# Patient Record
Sex: MALE | Race: BLACK OR AFRICAN AMERICAN | Employment: UNEMPLOYED | URBAN - METROPOLITAN AREA
[De-identification: names, ages, dates, MRNs, and addresses within clinical notes are randomized per-mention and may not be internally consistent; named-entity substitution may affect disease eponyms.]

---

## 2017-07-20 ENCOUNTER — HOSPITAL ENCOUNTER (EMERGENCY)
Age: 62
Discharge: LEFT BEFORE DISCHARGE | End: 2017-07-20
Attending: EMERGENCY MEDICINE
Payer: MEDICARE

## 2017-07-20 VITALS
OXYGEN SATURATION: 100 % | RESPIRATION RATE: 18 BRPM | HEIGHT: 62 IN | WEIGHT: 98.11 LBS | DIASTOLIC BLOOD PRESSURE: 84 MMHG | HEART RATE: 85 BPM | SYSTOLIC BLOOD PRESSURE: 142 MMHG | BODY MASS INDEX: 18.05 KG/M2 | TEMPERATURE: 98.3 F

## 2017-07-20 PROCEDURE — 75810000275 HC EMERGENCY DEPT VISIT NO LEVEL OF CARE

## 2017-07-21 ENCOUNTER — HOSPITAL ENCOUNTER (EMERGENCY)
Age: 62
Discharge: HOME | End: 2017-07-21
Attending: EMERGENCY MEDICINE
Payer: MEDICARE

## 2017-07-21 VITALS
RESPIRATION RATE: 18 BRPM | DIASTOLIC BLOOD PRESSURE: 98 MMHG | OXYGEN SATURATION: 100 % | WEIGHT: 95.9 LBS | HEIGHT: 62 IN | BODY MASS INDEX: 17.65 KG/M2 | TEMPERATURE: 97.7 F | HEART RATE: 61 BPM | SYSTOLIC BLOOD PRESSURE: 119 MMHG

## 2017-07-21 DIAGNOSIS — E83.42 HYPOMAGNESEMIA: Primary | ICD-10-CM

## 2017-07-21 LAB
ALBUMIN SERPL BCP-MCNC: 3.7 G/DL (ref 3.5–5)
ALBUMIN/GLOB SERPL: 1 {RATIO} (ref 1.1–2.2)
ALP SERPL-CCNC: 93 U/L (ref 45–117)
ALT SERPL-CCNC: 25 U/L (ref 12–78)
ANION GAP BLD CALC-SCNC: 8 MMOL/L (ref 5–15)
APPEARANCE UR: CLEAR
AST SERPL W P-5'-P-CCNC: 24 U/L (ref 15–37)
ATRIAL RATE: 61 BPM
BACTERIA URNS QL MICRO: NEGATIVE /HPF
BILIRUB SERPL-MCNC: 0.5 MG/DL (ref 0.2–1)
BILIRUB UR QL: NEGATIVE
BUN SERPL-MCNC: 25 MG/DL (ref 6–20)
BUN/CREAT SERPL: 18 (ref 12–20)
CALCIUM SERPL-MCNC: 9 MG/DL (ref 8.5–10.1)
CALCULATED P AXIS, ECG09: 78 DEGREES
CALCULATED R AXIS, ECG10: 54 DEGREES
CALCULATED T AXIS, ECG11: 59 DEGREES
CHLORIDE SERPL-SCNC: 107 MMOL/L (ref 97–108)
CO2 SERPL-SCNC: 23 MMOL/L (ref 21–32)
COLOR UR: NORMAL
CREAT SERPL-MCNC: 1.37 MG/DL (ref 0.7–1.3)
DIAGNOSIS, 93000: NORMAL
EPITH CASTS URNS QL MICRO: NORMAL /LPF
ERYTHROCYTE [DISTWIDTH] IN BLOOD BY AUTOMATED COUNT: 14.3 % (ref 11.5–14.5)
GLOBULIN SER CALC-MCNC: 3.7 G/DL (ref 2–4)
GLUCOSE SERPL-MCNC: 91 MG/DL (ref 65–100)
GLUCOSE UR STRIP.AUTO-MCNC: NEGATIVE MG/DL
HCT VFR BLD AUTO: 32.2 % (ref 36.6–50.3)
HGB BLD-MCNC: 10.9 G/DL (ref 12.1–17)
HGB UR QL STRIP: NEGATIVE
HYALINE CASTS URNS QL MICRO: NORMAL /LPF (ref 0–5)
KETONES UR QL STRIP.AUTO: NEGATIVE MG/DL
LEUKOCYTE ESTERASE UR QL STRIP.AUTO: NEGATIVE
MAGNESIUM SERPL-MCNC: 1.3 MG/DL (ref 1.6–2.4)
MCH RBC QN AUTO: 30.4 PG (ref 26–34)
MCHC RBC AUTO-ENTMCNC: 33.9 G/DL (ref 30–36.5)
MCV RBC AUTO: 89.9 FL (ref 80–99)
NITRITE UR QL STRIP.AUTO: NEGATIVE
P-R INTERVAL, ECG05: 150 MS
PH UR STRIP: 5.5 [PH] (ref 5–8)
PHOSPHATE SERPL-MCNC: 2.7 MG/DL (ref 2.6–4.7)
PLATELET # BLD AUTO: 190 K/UL (ref 150–400)
POTASSIUM SERPL-SCNC: 4.5 MMOL/L (ref 3.5–5.1)
PROT SERPL-MCNC: 7.4 G/DL (ref 6.4–8.2)
PROT UR STRIP-MCNC: NEGATIVE MG/DL
Q-T INTERVAL, ECG07: 372 MS
QRS DURATION, ECG06: 66 MS
QTC CALCULATION (BEZET), ECG08: 374 MS
RBC # BLD AUTO: 3.58 M/UL (ref 4.1–5.7)
RBC #/AREA URNS HPF: NORMAL /HPF (ref 0–5)
SODIUM SERPL-SCNC: 138 MMOL/L (ref 136–145)
SP GR UR REFRACTOMETRY: 1.01 (ref 1–1.03)
UA: UC IF INDICATED,UAUC: NORMAL
UROBILINOGEN UR QL STRIP.AUTO: 0.2 EU/DL (ref 0.2–1)
VENTRICULAR RATE, ECG03: 61 BPM
WBC # BLD AUTO: 6.5 K/UL (ref 4.1–11.1)
WBC URNS QL MICRO: NORMAL /HPF (ref 0–4)

## 2017-07-21 PROCEDURE — 80053 COMPREHEN METABOLIC PANEL: CPT | Performed by: EMERGENCY MEDICINE

## 2017-07-21 PROCEDURE — 81001 URINALYSIS AUTO W/SCOPE: CPT | Performed by: EMERGENCY MEDICINE

## 2017-07-21 PROCEDURE — 85027 COMPLETE CBC AUTOMATED: CPT | Performed by: EMERGENCY MEDICINE

## 2017-07-21 PROCEDURE — 83735 ASSAY OF MAGNESIUM: CPT | Performed by: EMERGENCY MEDICINE

## 2017-07-21 PROCEDURE — 36415 COLL VENOUS BLD VENIPUNCTURE: CPT | Performed by: EMERGENCY MEDICINE

## 2017-07-21 PROCEDURE — 96365 THER/PROPH/DIAG IV INF INIT: CPT

## 2017-07-21 PROCEDURE — 93005 ELECTROCARDIOGRAM TRACING: CPT

## 2017-07-21 PROCEDURE — 74011250636 HC RX REV CODE- 250/636: Performed by: EMERGENCY MEDICINE

## 2017-07-21 PROCEDURE — 96361 HYDRATE IV INFUSION ADD-ON: CPT

## 2017-07-21 PROCEDURE — 99285 EMERGENCY DEPT VISIT HI MDM: CPT

## 2017-07-21 PROCEDURE — 84100 ASSAY OF PHOSPHORUS: CPT | Performed by: EMERGENCY MEDICINE

## 2017-07-21 RX ORDER — SODIUM CHLORIDE 0.9 % (FLUSH) 0.9 %
5-10 SYRINGE (ML) INJECTION EVERY 8 HOURS
Status: DISCONTINUED | OUTPATIENT
Start: 2017-07-21 | End: 2017-07-21 | Stop reason: HOSPADM

## 2017-07-21 RX ORDER — MAGNESIUM SULFATE HEPTAHYDRATE 40 MG/ML
2 INJECTION, SOLUTION INTRAVENOUS
Status: COMPLETED | OUTPATIENT
Start: 2017-07-21 | End: 2017-07-21

## 2017-07-21 RX ORDER — LISINOPRIL AND HYDROCHLOROTHIAZIDE 12.5; 2 MG/1; MG/1
TABLET ORAL DAILY
COMMUNITY
End: 2017-12-24

## 2017-07-21 RX ORDER — ALLOPURINOL 100 MG/1
100 TABLET ORAL DAILY
COMMUNITY

## 2017-07-21 RX ORDER — SODIUM CHLORIDE 0.9 % (FLUSH) 0.9 %
5-10 SYRINGE (ML) INJECTION AS NEEDED
Status: DISCONTINUED | OUTPATIENT
Start: 2017-07-21 | End: 2017-07-21 | Stop reason: HOSPADM

## 2017-07-21 RX ORDER — ACETAMINOPHEN 500 MG
500 TABLET ORAL
COMMUNITY

## 2017-07-21 RX ADMIN — SODIUM CHLORIDE 1000 ML: 900 INJECTION, SOLUTION INTRAVENOUS at 11:36

## 2017-07-21 RX ADMIN — MAGNESIUM SULFATE HEPTAHYDRATE 2 G: 40 INJECTION, SOLUTION INTRAVENOUS at 11:36

## 2017-07-21 NOTE — ED PROVIDER NOTES
HPI Comments: Mohinder Moscoso is a 65 y.o. M with PMHx significant for HTN who presents ambulatory to ED [facility] ED c/o abnormal lab results that shows decreased kidney function. Today, pt also endorses generalized weakness. Pt visited his PCP last week and had blood work that was abnormal. His PCP called him yesterday and recommended pt visit the ED. Patient was then seen in the ED yesterday night and discharged home. PCP called pt again this morning and said that he was in kidney failure and should visit ED again today because \"he could be admitted for dialysis\" due to potassium levels. Per pt's mother, pt had a syncopal episode \"a few weeks ago\" and after he woke up from episode he \"went back to sleep\". Recently, pt reports he has been outside and frequently sweating. Pt notes occasional etOH use, denies any tobacco or drug use. He denies any hx of kidney problems. Pt specifically denies any chest pain, SOB, nausea, vomiting, diarrhea or urinary sx. PCP: None    There are no other complaints, changes, or physical findings at this time. The history is provided by the patient. Past Medical History:   Diagnosis Date    Hypertension        History reviewed. No pertinent surgical history. History reviewed. No pertinent family history. Social History     Social History    Marital status: SINGLE     Spouse name: N/A    Number of children: N/A    Years of education: N/A     Occupational History    Not on file. Social History Main Topics    Smoking status: Never Smoker    Smokeless tobacco: Never Used    Alcohol use Yes      Comment: Everyday    Drug use: No    Sexual activity: Not on file     Other Topics Concern    Not on file     Social History Narrative    No narrative on file         ALLERGIES: Review of patient's allergies indicates no known allergies. Review of Systems   Constitutional: Negative for appetite change, chills, fatigue and fever. HENT: Negative.   Negative for congestion, rhinorrhea, sinus pressure and sore throat. Eyes: Negative. Respiratory: Negative. Negative for cough, choking, chest tightness, shortness of breath and wheezing. Cardiovascular: Negative. Negative for chest pain, palpitations and leg swelling. Gastrointestinal: Negative for abdominal pain, constipation, diarrhea, nausea and vomiting. Endocrine: Negative. Genitourinary: Negative. Negative for difficulty urinating, dysuria, flank pain, frequency, hematuria and urgency. Musculoskeletal: Negative. Skin: Negative. Neurological: Positive for weakness (generalized). Negative for dizziness, speech difficulty, light-headedness, numbness and headaches. Psychiatric/Behavioral: Negative. All other systems reviewed and are negative. Vitals:    07/21/17 1145 07/21/17 1215 07/21/17 1245 07/21/17 1315   BP: 127/81 124/77 118/78 126/82   Pulse: (!) 56 (!) 57 (!) 58 60   Resp: 17 16 14 19   Temp:       SpO2: 99% 99% 100% 100%   Weight:       Height:                Physical Exam   Constitutional: He is oriented to person, place, and time. He appears well-developed and well-nourished. No distress. HENT:   Head: Normocephalic and atraumatic. Mouth/Throat: Oropharynx is clear and moist. No oropharyngeal exudate. Eyes: Conjunctivae and EOM are normal. Pupils are equal, round, and reactive to light. Neck: Normal range of motion. Neck supple. No JVD present. No tracheal deviation present. Cardiovascular: Normal rate, regular rhythm, normal heart sounds and intact distal pulses. No murmur heard. Pulmonary/Chest: Effort normal and breath sounds normal. No stridor. No respiratory distress. He has no wheezes. He has no rales. He exhibits no tenderness. Abdominal: Soft. He exhibits no distension. There is no tenderness. There is no rebound and no guarding. Musculoskeletal: Normal range of motion. He exhibits no edema or tenderness.    Neurological: He is alert and oriented to person, place, and time. No cranial nerve deficit. No focal motor or sensory deficits    Skin: Skin is warm and dry. He is not diaphoretic. Psychiatric: He has a normal mood and affect. His behavior is normal.   Nursing note and vitals reviewed. MDM  Number of Diagnoses or Management Options  Diagnosis management comments: DDx: Acute renal failure, Electrolyte abnormality. Amount and/or Complexity of Data Reviewed  Clinical lab tests: ordered and reviewed  Tests in the medicine section of CPT®: ordered and reviewed  Review and summarize past medical records: yes  Independent visualization of images, tracings, or specimens: yes    Patient Progress  Patient progress: stable    ED Course       Procedures     EKG- NSR, rate 61, normal pr/qrs/axis, no acute St-T wave changes, Grace Kirk DO      PROGRESS NOTE:  12:56 PM  Attempted to contact [facility] hospitalist on three separate occasions but did not receive a call back. Written by Gordon Jara. Lashonda Orozco, ED Scribe, as dictated by Cami Denton DO. PROGRESS NOTE:  1:20 PM  Pt reevaluated. Pt is feeling better and ready for discharge. Written by Gordon Jara.  Norberto, ED Scribe, as dictated by Cami Denton DO    Patient Vitals for the past 12 hrs:   Temp Pulse Resp BP SpO2   07/21/17 1315 - 60 19 126/82 100 %   07/21/17 1245 - (!) 58 14 118/78 100 %   07/21/17 1215 - (!) 57 16 124/77 99 %   07/21/17 1145 - (!) 56 17 127/81 99 %   07/21/17 1115 - (!) 56 18 133/82 100 %   07/21/17 1100 - 65 18 125/87 99 %   07/21/17 1004 98 °F (36.7 °C) 63 16 115/69 100 %       LABORATORY TESTS:  Recent Results (from the past 12 hour(s))   EKG, 12 LEAD, INITIAL    Collection Time: 07/21/17 10:29 AM   Result Value Ref Range    Ventricular Rate 61 BPM    Atrial Rate 61 BPM    P-R Interval 150 ms    QRS Duration 66 ms    Q-T Interval 372 ms    QTC Calculation (Bezet) 374 ms    Calculated P Axis 78 degrees    Calculated R Axis 54 degrees    Calculated T Axis 59 degrees Diagnosis       ** Poor data quality, interpretation may be adversely affected  Normal sinus rhythm      Confirmed by Jason Allen MD, Casimiro Leyden ([id number]) on 7/21/2017 1:01:32 PM     METABOLIC PANEL, COMPREHENSIVE    Collection Time: 07/21/17 10:35 AM   Result Value Ref Range    Sodium 138 136 - 145 mmol/L    Potassium 4.5 3.5 - 5.1 mmol/L    Chloride 107 97 - 108 mmol/L    CO2 23 21 - 32 mmol/L    Anion gap 8 5 - 15 mmol/L    Glucose 91 65 - 100 mg/dL    BUN 25 (H) 6 - 20 MG/DL    Creatinine 1.37 (H) 0.70 - 1.30 MG/DL    BUN/Creatinine ratio 18 12 - 20      GFR est AA >60 >60 ml/min/1.73m2    GFR est non-AA 53 (L) >60 ml/min/1.73m2    Calcium 9.0 8.5 - 10.1 MG/DL    Bilirubin, total 0.5 0.2 - 1.0 MG/DL    ALT (SGPT) 25 12 - 78 U/L    AST (SGOT) 24 15 - 37 U/L    Alk.  phosphatase 93 45 - 117 U/L    Protein, total 7.4 6.4 - 8.2 g/dL    Albumin 3.7 3.5 - 5.0 g/dL    Globulin 3.7 2.0 - 4.0 g/dL    A-G Ratio 1.0 (L) 1.1 - 2.2     CBC W/O DIFF    Collection Time: 07/21/17 10:35 AM   Result Value Ref Range    WBC 6.5 4.1 - 11.1 K/uL    RBC 3.58 (L) 4.10 - 5.70 M/uL    HGB 10.9 (L) 12.1 - 17.0 g/dL    HCT 32.2 (L) 36.6 - 50.3 %    MCV 89.9 80.0 - 99.0 FL    MCH 30.4 26.0 - 34.0 PG    MCHC 33.9 30.0 - 36.5 g/dL    RDW 14.3 11.5 - 14.5 %    PLATELET 455 417 - 289 K/uL   MAGNESIUM    Collection Time: 07/21/17 10:35 AM   Result Value Ref Range    Magnesium 1.3 (L) 1.6 - 2.4 mg/dL   PHOSPHORUS    Collection Time: 07/21/17 10:35 AM   Result Value Ref Range    Phosphorus 2.7 2.6 - 4.7 MG/DL   URINALYSIS W/ REFLEX CULTURE    Collection Time: 07/21/17 11:15 AM   Result Value Ref Range    Color YELLOW/STRAW      Appearance CLEAR CLEAR      Specific gravity 1.009 1.003 - 1.030      pH (UA) 5.5 5.0 - 8.0      Protein NEGATIVE  NEG mg/dL    Glucose NEGATIVE  NEG mg/dL    Ketone NEGATIVE  NEG mg/dL    Bilirubin NEGATIVE  NEG      Blood NEGATIVE  NEG      Urobilinogen 0.2 0.2 - 1.0 EU/dL    Nitrites NEGATIVE  NEG      Leukocyte Esterase NEGATIVE NEG      WBC 0-4 0 - 4 /hpf    RBC 0-5 0 - 5 /hpf    Epithelial cells FEW FEW /lpf    Bacteria NEGATIVE  NEG /hpf    UA:UC IF INDICATED CULTURE NOT INDICATED BY UA RESULT CNI      Hyaline cast 0-2 0 - 5 /lpf     MEDICATIONS GIVEN:  Medications   sodium chloride (NS) flush 5-10 mL (not administered)   sodium chloride (NS) flush 5-10 mL (not administered)   magnesium sulfate 2 g/50 ml IVPB (premix or compounded) (0 g IntraVENous IV Completed 7/21/17 1236)   sodium chloride 0.9 % bolus infusion 1,000 mL (1,000 mL IntraVENous New Bag 7/21/17 1136)       IMPRESSION:  1. Hypomagnesemia        PLAN:  1. Current Discharge Medication List        2. Follow-up Information     Follow up With Details Comments Contact Info    None  As needed None ([phone]) Patient stated that they have no PCP        Follow up with your [facility] provider         Return to ED if worse     DISCHARGE NOTE:  1:24 PM  The patient's results have been reviewed with family and/or caregiver. They verbally convey their understanding and agreement of the patient's signs, symptoms, diagnosis, treatment, and prognosis. They additionally agree to follow up as recommended in the discharge instructions or to return to the Emergency Room should the patient's condition change prior to their follow-up appointment. The family and/or caregiver verbally agrees with the care-plan and all of their questions have been answered. The discharge instructions have also been provided to the them along with educational information regarding the patient's diagnosis and a list of reasons why the patient would want to return to the ER prior to their follow-up appointment should their condition change. Written by Ethan Padilla, ED Scribe, as dictated by Marco Barrett DO. Attestations: This note is prepared by Ethan Drake.  Norberto, acting as Scribe for Marco Barrett, [facility], : The scribe's documentation has been prepared under my direction and personally reviewed by me in its entirety. I confirm that the note above accurately reflects all work, treatment, procedures, and medical decision making performed by me.

## 2017-07-21 NOTE — ED PROVIDER NOTES
HPI     No past medical history on file. No past surgical history on file. No family history on file. Social History     Social History    Marital status: SINGLE     Spouse name: N/A    Number of children: N/A    Years of education: N/A     Occupational History    Not on file. Social History Main Topics    Smoking status: Not on file    Smokeless tobacco: Not on file    Alcohol use Not on file    Drug use: Not on file    Sexual activity: Not on file     Other Topics Concern    Not on file     Social History Narrative         ALLERGIES: Review of patient's allergies indicates no known allergies.     Review of Systems    Vitals:    07/20/17 2030   BP: 142/84   Pulse: 85   Resp: 18   Temp: 98.3 °F (36.8 °C)   SpO2: 100%   Weight: 44.5 kg (98 lb 1.7 oz)   Height: 5' 2\" (1.575 m)            Physical Exam     [facility]  ED Course       Procedures

## 2017-07-21 NOTE — ED NOTES
Patient's mother reports patient had a syncopal episode \"a few weeks ago\". Patient stated that after he woke up he \"went back to sleep\".

## 2017-07-21 NOTE — ED NOTES
Assumed care of the patient. Patient mother at the bedside. Patient visited PCP last week and blood work was abnormal. PCP called yesterday and told patient to visit the ED. Patient visited ED last night, PCP called this morning and told mother that the patient needed to visit ED again today because \"he could be admitted for dialysis\" due to potassium levels.

## 2017-12-18 ENCOUNTER — ANESTHESIA EVENT (OUTPATIENT)
Dept: SURGERY | Age: 62
DRG: 356 | End: 2017-12-18
Payer: MEDICARE

## 2017-12-18 ENCOUNTER — HOSPITAL ENCOUNTER (INPATIENT)
Age: 62
LOS: 6 days | Discharge: HOME | DRG: 356 | End: 2017-12-24
Attending: EMERGENCY MEDICINE | Admitting: FAMILY MEDICINE
Payer: MEDICARE

## 2017-12-18 ENCOUNTER — APPOINTMENT (OUTPATIENT)
Dept: GENERAL RADIOLOGY | Age: 62
DRG: 356 | End: 2017-12-18
Attending: EMERGENCY MEDICINE
Payer: MEDICARE

## 2017-12-18 ENCOUNTER — ANESTHESIA (OUTPATIENT)
Dept: SURGERY | Age: 62
DRG: 356 | End: 2017-12-18
Payer: MEDICARE

## 2017-12-18 ENCOUNTER — APPOINTMENT (OUTPATIENT)
Dept: CT IMAGING | Age: 62
DRG: 356 | End: 2017-12-18
Attending: EMERGENCY MEDICINE
Payer: MEDICARE

## 2017-12-18 ENCOUNTER — APPOINTMENT (OUTPATIENT)
Dept: ULTRASOUND IMAGING | Age: 62
DRG: 356 | End: 2017-12-18
Attending: FAMILY MEDICINE
Payer: MEDICARE

## 2017-12-18 DIAGNOSIS — K55.9 ISCHEMIC BOWEL DISEASE (HCC): Primary | ICD-10-CM

## 2017-12-18 DIAGNOSIS — R00.1 BRADYCARDIA: ICD-10-CM

## 2017-12-18 DIAGNOSIS — N17.9 ACUTE RENAL FAILURE, UNSPECIFIED ACUTE RENAL FAILURE TYPE (HCC): ICD-10-CM

## 2017-12-18 DIAGNOSIS — E86.1 HYPOVOLEMIA: ICD-10-CM

## 2017-12-18 DIAGNOSIS — R19.7 DIARRHEA, UNSPECIFIED TYPE: ICD-10-CM

## 2017-12-18 PROBLEM — R55 SYNCOPE: Status: ACTIVE | Noted: 2017-12-18

## 2017-12-18 PROBLEM — K63.89 PNEUMATOSIS INTESTINALIS: Status: ACTIVE | Noted: 2017-12-18

## 2017-12-18 PROBLEM — E87.20 LACTIC ACIDOSIS: Status: ACTIVE | Noted: 2017-12-18

## 2017-12-18 PROBLEM — R57.9 SHOCK (HCC): Status: ACTIVE | Noted: 2017-12-18

## 2017-12-18 LAB
ALBUMIN SERPL-MCNC: 4.1 G/DL (ref 3.5–5)
ALBUMIN/GLOB SERPL: 1 {RATIO} (ref 1.1–2.2)
ALP SERPL-CCNC: 87 U/L (ref 45–117)
ALT SERPL-CCNC: 24 U/L (ref 12–78)
ANION GAP SERPL CALC-SCNC: 14 MMOL/L (ref 5–15)
APPEARANCE UR: ABNORMAL
ARTERIAL PATENCY WRIST A: YES
AST SERPL-CCNC: 32 U/L (ref 15–37)
BACTERIA URNS QL MICRO: ABNORMAL /HPF
BASE DEFICIT BLD-SCNC: 11 MMOL/L
BDY SITE: ABNORMAL
BILIRUB SERPL-MCNC: 0.3 MG/DL (ref 0.2–1)
BILIRUB UR QL: NEGATIVE
BUN SERPL-MCNC: 45 MG/DL (ref 6–20)
BUN/CREAT SERPL: 9 (ref 12–20)
CALCIUM SERPL-MCNC: 9.1 MG/DL (ref 8.5–10.1)
CHLORIDE SERPL-SCNC: 97 MMOL/L (ref 97–108)
CK MB CFR SERPL CALC: ABNORMAL % (ref 0–2.5)
CK MB SERPL-MCNC: <1 NG/ML (ref 5–25)
CK SERPL-CCNC: 370 U/L (ref 39–308)
CO2 SERPL-SCNC: 19 MMOL/L (ref 21–32)
COLOR UR: ABNORMAL
CREAT SERPL-MCNC: 5.01 MG/DL (ref 0.7–1.3)
EPITH CASTS URNS QL MICRO: ABNORMAL /LPF
ERYTHROCYTE [DISTWIDTH] IN BLOOD BY AUTOMATED COUNT: 14.1 % (ref 11.5–14.5)
GAS FLOW.O2 O2 DELIVERY SYS: ABNORMAL L/MIN
GLOBULIN SER CALC-MCNC: 4.1 G/DL (ref 2–4)
GLUCOSE SERPL-MCNC: 219 MG/DL (ref 65–100)
GLUCOSE UR STRIP.AUTO-MCNC: 100 MG/DL
HCO3 BLD-SCNC: 13.4 MMOL/L (ref 22–26)
HCT VFR BLD AUTO: 38.5 % (ref 36.6–50.3)
HGB BLD-MCNC: 13.1 G/DL (ref 12.1–17)
HGB UR QL STRIP: ABNORMAL
KETONES UR QL STRIP.AUTO: NEGATIVE MG/DL
LACTATE SERPL-SCNC: 1.5 MMOL/L (ref 0.4–2)
LACTATE SERPL-SCNC: 3.5 MMOL/L (ref 0.4–2)
LEUKOCYTE ESTERASE UR QL STRIP.AUTO: NEGATIVE
MAGNESIUM SERPL-MCNC: 1.8 MG/DL (ref 1.6–2.4)
MCH RBC QN AUTO: 31.1 PG (ref 26–34)
MCHC RBC AUTO-ENTMCNC: 34 G/DL (ref 30–36.5)
MCV RBC AUTO: 91.4 FL (ref 80–99)
MUCOUS THREADS URNS QL MICRO: ABNORMAL /LPF
NITRITE UR QL STRIP.AUTO: NEGATIVE
PCO2 BLD: 20.8 MMHG (ref 35–45)
PH BLD: 7.42 [PH] (ref 7.35–7.45)
PH UR STRIP: 5.5 [PH] (ref 5–8)
PHOSPHATE SERPL-MCNC: 7.3 MG/DL (ref 2.6–4.7)
PLATELET # BLD AUTO: 181 K/UL (ref 150–400)
PO2 BLD: 102 MMHG (ref 80–100)
POTASSIUM SERPL-SCNC: 4.3 MMOL/L (ref 3.5–5.1)
PROT SERPL-MCNC: 8.2 G/DL (ref 6.4–8.2)
PROT UR STRIP-MCNC: 100 MG/DL
RBC # BLD AUTO: 4.21 M/UL (ref 4.1–5.7)
RBC #/AREA URNS HPF: ABNORMAL /HPF (ref 0–5)
SAO2 % BLD: 98 % (ref 92–97)
SODIUM SERPL-SCNC: 130 MMOL/L (ref 136–145)
SP GR UR REFRACTOMETRY: 1.01 (ref 1–1.03)
SPECIMEN TYPE: ABNORMAL
TROPONIN I SERPL-MCNC: <0.04 NG/ML
UR CULT HOLD, URHOLD: NORMAL
UROBILINOGEN UR QL STRIP.AUTO: 0.2 EU/DL (ref 0.2–1)
WBC # BLD AUTO: 9.8 K/UL (ref 4.1–11.1)
WBC URNS QL MICRO: ABNORMAL /HPF (ref 0–4)

## 2017-12-18 PROCEDURE — 80053 COMPREHEN METABOLIC PANEL: CPT | Performed by: EMERGENCY MEDICINE

## 2017-12-18 PROCEDURE — 76060000033 HC ANESTHESIA 1 TO 1.5 HR: Performed by: SURGERY

## 2017-12-18 PROCEDURE — P9045 ALBUMIN (HUMAN), 5%, 250 ML: HCPCS

## 2017-12-18 PROCEDURE — 71010 XR CHEST PORT: CPT

## 2017-12-18 PROCEDURE — 77030026438 HC STYL ET INTUB CARD -A: Performed by: ANESTHESIOLOGY

## 2017-12-18 PROCEDURE — 86706 HEP B SURFACE ANTIBODY: CPT | Performed by: FAMILY MEDICINE

## 2017-12-18 PROCEDURE — 65660000000 HC RM CCU STEPDOWN

## 2017-12-18 PROCEDURE — 82803 BLOOD GASES ANY COMBINATION: CPT

## 2017-12-18 PROCEDURE — 74011250636 HC RX REV CODE- 250/636

## 2017-12-18 PROCEDURE — 77030019702 HC WRP THER MENM -C: Performed by: SURGERY

## 2017-12-18 PROCEDURE — 80053 COMPREHEN METABOLIC PANEL: CPT | Performed by: FAMILY MEDICINE

## 2017-12-18 PROCEDURE — 93005 ELECTROCARDIOGRAM TRACING: CPT

## 2017-12-18 PROCEDURE — 74011250636 HC RX REV CODE- 250/636: Performed by: FAMILY MEDICINE

## 2017-12-18 PROCEDURE — 77030018836 HC SOL IRR NACL ICUM -A: Performed by: SURGERY

## 2017-12-18 PROCEDURE — 76770 US EXAM ABDO BACK WALL COMP: CPT

## 2017-12-18 PROCEDURE — 51702 INSERT TEMP BLADDER CATH: CPT

## 2017-12-18 PROCEDURE — 82550 ASSAY OF CK (CPK): CPT | Performed by: EMERGENCY MEDICINE

## 2017-12-18 PROCEDURE — 77030010507 HC ADH SKN DERMBND J&J -B: Performed by: SURGERY

## 2017-12-18 PROCEDURE — 36600 WITHDRAWAL OF ARTERIAL BLOOD: CPT

## 2017-12-18 PROCEDURE — 82595 ASSAY OF CRYOGLOBULIN: CPT | Performed by: FAMILY MEDICINE

## 2017-12-18 PROCEDURE — 74176 CT ABD & PELVIS W/O CONTRAST: CPT

## 2017-12-18 PROCEDURE — 83735 ASSAY OF MAGNESIUM: CPT | Performed by: EMERGENCY MEDICINE

## 2017-12-18 PROCEDURE — 0WJG0ZZ INSPECTION OF PERITONEAL CAVITY, OPEN APPROACH: ICD-10-PCS | Performed by: SURGERY

## 2017-12-18 PROCEDURE — 77030032490 HC SLV COMPR SCD KNE COVD -B: Performed by: SURGERY

## 2017-12-18 PROCEDURE — 74011250636 HC RX REV CODE- 250/636: Performed by: EMERGENCY MEDICINE

## 2017-12-18 PROCEDURE — 84100 ASSAY OF PHOSPHORUS: CPT | Performed by: EMERGENCY MEDICINE

## 2017-12-18 PROCEDURE — 83605 ASSAY OF LACTIC ACID: CPT | Performed by: EMERGENCY MEDICINE

## 2017-12-18 PROCEDURE — 87040 BLOOD CULTURE FOR BACTERIA: CPT | Performed by: EMERGENCY MEDICINE

## 2017-12-18 PROCEDURE — 74011000250 HC RX REV CODE- 250: Performed by: FAMILY MEDICINE

## 2017-12-18 PROCEDURE — 74011000250 HC RX REV CODE- 250

## 2017-12-18 PROCEDURE — 77030018846 HC SOL IRR STRL H20 ICUM -A: Performed by: SURGERY

## 2017-12-18 PROCEDURE — 36415 COLL VENOUS BLD VENIPUNCTURE: CPT | Performed by: EMERGENCY MEDICINE

## 2017-12-18 PROCEDURE — 96360 HYDRATION IV INFUSION INIT: CPT

## 2017-12-18 PROCEDURE — 87086 URINE CULTURE/COLONY COUNT: CPT | Performed by: EMERGENCY MEDICINE

## 2017-12-18 PROCEDURE — 86803 HEPATITIS C AB TEST: CPT | Performed by: FAMILY MEDICINE

## 2017-12-18 PROCEDURE — 84484 ASSAY OF TROPONIN QUANT: CPT | Performed by: EMERGENCY MEDICINE

## 2017-12-18 PROCEDURE — 85027 COMPLETE CBC AUTOMATED: CPT | Performed by: EMERGENCY MEDICINE

## 2017-12-18 PROCEDURE — 77030011640 HC PAD GRND REM COVD -A: Performed by: SURGERY

## 2017-12-18 PROCEDURE — 77030002996 HC SUT SLK J&J -A: Performed by: SURGERY

## 2017-12-18 PROCEDURE — 77030002966 HC SUT PDS J&J -A: Performed by: SURGERY

## 2017-12-18 PROCEDURE — 76210000006 HC OR PH I REC 0.5 TO 1 HR: Performed by: SURGERY

## 2017-12-18 PROCEDURE — 77030008684 HC TU ET CUF COVD -B: Performed by: ANESTHESIOLOGY

## 2017-12-18 PROCEDURE — 77030008467 HC STPLR SKN COVD -B: Performed by: SURGERY

## 2017-12-18 PROCEDURE — 99285 EMERGENCY DEPT VISIT HI MDM: CPT

## 2017-12-18 PROCEDURE — 77030031753 HC SHR ENDO COAG HARM J&J -E: Performed by: SURGERY

## 2017-12-18 PROCEDURE — 81001 URINALYSIS AUTO W/SCOPE: CPT | Performed by: EMERGENCY MEDICINE

## 2017-12-18 PROCEDURE — 77030013079 HC BLNKT BAIR HGGR 3M -A: Performed by: ANESTHESIOLOGY

## 2017-12-18 PROCEDURE — 84165 PROTEIN E-PHORESIS SERUM: CPT | Performed by: FAMILY MEDICINE

## 2017-12-18 PROCEDURE — 77030008771 HC TU NG SALEM SUMP -A: Performed by: ANESTHESIOLOGY

## 2017-12-18 PROCEDURE — 76010000149 HC OR TIME 1 TO 1.5 HR: Performed by: SURGERY

## 2017-12-18 RX ORDER — SODIUM CHLORIDE 9 MG/ML
125 INJECTION, SOLUTION INTRAVENOUS CONTINUOUS
Status: DISCONTINUED | OUTPATIENT
Start: 2017-12-18 | End: 2017-12-19

## 2017-12-18 RX ORDER — SODIUM CHLORIDE 0.9 % (FLUSH) 0.9 %
5-10 SYRINGE (ML) INJECTION AS NEEDED
Status: DISCONTINUED | OUTPATIENT
Start: 2017-12-18 | End: 2017-12-19 | Stop reason: HOSPADM

## 2017-12-18 RX ORDER — MIDAZOLAM HYDROCHLORIDE 1 MG/ML
INJECTION, SOLUTION INTRAMUSCULAR; INTRAVENOUS AS NEEDED
Status: DISCONTINUED | OUTPATIENT
Start: 2017-12-18 | End: 2017-12-19 | Stop reason: HOSPADM

## 2017-12-18 RX ORDER — SODIUM CHLORIDE 0.9 % (FLUSH) 0.9 %
5-10 SYRINGE (ML) INJECTION EVERY 8 HOURS
Status: DISCONTINUED | OUTPATIENT
Start: 2017-12-18 | End: 2017-12-19 | Stop reason: HOSPADM

## 2017-12-18 RX ORDER — MIDAZOLAM HYDROCHLORIDE 1 MG/ML
1 INJECTION, SOLUTION INTRAMUSCULAR; INTRAVENOUS AS NEEDED
Status: DISCONTINUED | OUTPATIENT
Start: 2017-12-18 | End: 2017-12-19 | Stop reason: HOSPADM

## 2017-12-18 RX ORDER — PHENYLEPHRINE HCL IN 0.9% NACL 0.4MG/10ML
SYRINGE (ML) INTRAVENOUS AS NEEDED
Status: DISCONTINUED | OUTPATIENT
Start: 2017-12-18 | End: 2017-12-19 | Stop reason: HOSPADM

## 2017-12-18 RX ORDER — FENTANYL CITRATE 50 UG/ML
INJECTION, SOLUTION INTRAMUSCULAR; INTRAVENOUS AS NEEDED
Status: DISCONTINUED | OUTPATIENT
Start: 2017-12-18 | End: 2017-12-19 | Stop reason: HOSPADM

## 2017-12-18 RX ORDER — SODIUM CHLORIDE 0.9 % (FLUSH) 0.9 %
5-10 SYRINGE (ML) INJECTION AS NEEDED
Status: DISCONTINUED | OUTPATIENT
Start: 2017-12-18 | End: 2017-12-20 | Stop reason: SDUPTHER

## 2017-12-18 RX ORDER — LIDOCAINE HYDROCHLORIDE 10 MG/ML
0.1 INJECTION, SOLUTION EPIDURAL; INFILTRATION; INTRACAUDAL; PERINEURAL AS NEEDED
Status: DISCONTINUED | OUTPATIENT
Start: 2017-12-18 | End: 2017-12-19 | Stop reason: HOSPADM

## 2017-12-18 RX ORDER — ALBUMIN HUMAN 50 G/1000ML
SOLUTION INTRAVENOUS AS NEEDED
Status: DISCONTINUED | OUTPATIENT
Start: 2017-12-18 | End: 2017-12-19 | Stop reason: HOSPADM

## 2017-12-18 RX ORDER — PROPOFOL 10 MG/ML
INJECTION, EMULSION INTRAVENOUS AS NEEDED
Status: DISCONTINUED | OUTPATIENT
Start: 2017-12-18 | End: 2017-12-19 | Stop reason: HOSPADM

## 2017-12-18 RX ORDER — DEXAMETHASONE SODIUM PHOSPHATE 4 MG/ML
INJECTION, SOLUTION INTRA-ARTICULAR; INTRALESIONAL; INTRAMUSCULAR; INTRAVENOUS; SOFT TISSUE AS NEEDED
Status: DISCONTINUED | OUTPATIENT
Start: 2017-12-18 | End: 2017-12-19 | Stop reason: HOSPADM

## 2017-12-18 RX ORDER — ONDANSETRON 2 MG/ML
INJECTION INTRAMUSCULAR; INTRAVENOUS AS NEEDED
Status: DISCONTINUED | OUTPATIENT
Start: 2017-12-18 | End: 2017-12-19 | Stop reason: HOSPADM

## 2017-12-18 RX ORDER — ACETAMINOPHEN 10 MG/ML
INJECTION, SOLUTION INTRAVENOUS AS NEEDED
Status: DISCONTINUED | OUTPATIENT
Start: 2017-12-18 | End: 2017-12-19 | Stop reason: HOSPADM

## 2017-12-18 RX ORDER — LORAZEPAM 2 MG/ML
2 INJECTION INTRAMUSCULAR
Status: DISCONTINUED | OUTPATIENT
Start: 2017-12-18 | End: 2017-12-24 | Stop reason: HOSPADM

## 2017-12-18 RX ORDER — LIDOCAINE HYDROCHLORIDE 20 MG/ML
INJECTION, SOLUTION EPIDURAL; INFILTRATION; INTRACAUDAL; PERINEURAL AS NEEDED
Status: DISCONTINUED | OUTPATIENT
Start: 2017-12-18 | End: 2017-12-19 | Stop reason: HOSPADM

## 2017-12-18 RX ORDER — SUCCINYLCHOLINE CHLORIDE 20 MG/ML
INJECTION INTRAMUSCULAR; INTRAVENOUS AS NEEDED
Status: DISCONTINUED | OUTPATIENT
Start: 2017-12-18 | End: 2017-12-19 | Stop reason: HOSPADM

## 2017-12-18 RX ORDER — LORAZEPAM 2 MG/ML
1 INJECTION INTRAMUSCULAR
Status: DISCONTINUED | OUTPATIENT
Start: 2017-12-18 | End: 2017-12-24 | Stop reason: HOSPADM

## 2017-12-18 RX ORDER — FENTANYL CITRATE 50 UG/ML
50 INJECTION, SOLUTION INTRAMUSCULAR; INTRAVENOUS AS NEEDED
Status: DISCONTINUED | OUTPATIENT
Start: 2017-12-18 | End: 2017-12-19 | Stop reason: HOSPADM

## 2017-12-18 RX ORDER — EPHEDRINE SULFATE 50 MG/ML
INJECTION, SOLUTION INTRAVENOUS AS NEEDED
Status: DISCONTINUED | OUTPATIENT
Start: 2017-12-18 | End: 2017-12-19 | Stop reason: HOSPADM

## 2017-12-18 RX ORDER — PIPERACILLIN SODIUM, TAZOBACTAM SODIUM 3; .375 G/15ML; G/15ML
INJECTION, POWDER, LYOPHILIZED, FOR SOLUTION INTRAVENOUS
Status: DISPENSED
Start: 2017-12-18 | End: 2017-12-19

## 2017-12-18 RX ORDER — SODIUM CHLORIDE, SODIUM LACTATE, POTASSIUM CHLORIDE, CALCIUM CHLORIDE 600; 310; 30; 20 MG/100ML; MG/100ML; MG/100ML; MG/100ML
125 INJECTION, SOLUTION INTRAVENOUS CONTINUOUS
Status: DISCONTINUED | OUTPATIENT
Start: 2017-12-18 | End: 2017-12-19 | Stop reason: HOSPADM

## 2017-12-18 RX ORDER — SODIUM CHLORIDE 9 MG/ML
50 INJECTION, SOLUTION INTRAVENOUS CONTINUOUS
Status: DISCONTINUED | OUTPATIENT
Start: 2017-12-18 | End: 2017-12-19

## 2017-12-18 RX ORDER — ROCURONIUM BROMIDE 10 MG/ML
INJECTION, SOLUTION INTRAVENOUS AS NEEDED
Status: DISCONTINUED | OUTPATIENT
Start: 2017-12-18 | End: 2017-12-19 | Stop reason: HOSPADM

## 2017-12-18 RX ORDER — ATROPINE SULFATE 1 MG/ML
INJECTION, SOLUTION INTRAVENOUS
Status: COMPLETED
Start: 2017-12-18 | End: 2017-12-18

## 2017-12-18 RX ORDER — SODIUM CHLORIDE 0.9 % (FLUSH) 0.9 %
5-10 SYRINGE (ML) INJECTION EVERY 8 HOURS
Status: DISCONTINUED | OUTPATIENT
Start: 2017-12-18 | End: 2017-12-20 | Stop reason: SDUPTHER

## 2017-12-18 RX ADMIN — SODIUM CHLORIDE 1000 ML: 900 INJECTION, SOLUTION INTRAVENOUS at 19:12

## 2017-12-18 RX ADMIN — SODIUM CHLORIDE 1000 ML: 900 INJECTION, SOLUTION INTRAVENOUS at 19:19

## 2017-12-18 RX ADMIN — PIPERACILLIN SODIUM AND TAZOBACTAM SODIUM 6.75 G: 3; .375 INJECTION, POWDER, LYOPHILIZED, FOR SOLUTION INTRAVENOUS at 23:53

## 2017-12-18 RX ADMIN — ALBUMIN HUMAN 250 ML: 50 SOLUTION INTRAVENOUS at 23:47

## 2017-12-18 RX ADMIN — ACETAMINOPHEN 500 MG: 10 INJECTION, SOLUTION INTRAVENOUS at 23:55

## 2017-12-18 RX ADMIN — PROPOFOL 120 MG: 10 INJECTION, EMULSION INTRAVENOUS at 23:22

## 2017-12-18 RX ADMIN — Medication 80 MCG: at 23:33

## 2017-12-18 RX ADMIN — ROCURONIUM BROMIDE 5 MG: 10 INJECTION, SOLUTION INTRAVENOUS at 23:22

## 2017-12-18 RX ADMIN — SODIUM CHLORIDE 500 ML: 900 INJECTION, SOLUTION INTRAVENOUS at 20:19

## 2017-12-18 RX ADMIN — ONDANSETRON 4 MG: 2 INJECTION INTRAMUSCULAR; INTRAVENOUS at 23:58

## 2017-12-18 RX ADMIN — FOLIC ACID: 5 INJECTION, SOLUTION INTRAMUSCULAR; INTRAVENOUS; SUBCUTANEOUS at 23:46

## 2017-12-18 RX ADMIN — Medication 80 MCG: at 23:26

## 2017-12-18 RX ADMIN — SODIUM CHLORIDE 1000 ML: 900 INJECTION, SOLUTION INTRAVENOUS at 21:23

## 2017-12-18 RX ADMIN — ATROPINE SULFATE 1 MG: 1 INJECTION, SOLUTION INTRAMUSCULAR; INTRAVENOUS; SUBCUTANEOUS at 19:12

## 2017-12-18 RX ADMIN — DEXAMETHASONE SODIUM PHOSPHATE 4 MG: 4 INJECTION, SOLUTION INTRA-ARTICULAR; INTRALESIONAL; INTRAMUSCULAR; INTRAVENOUS; SOFT TISSUE at 23:58

## 2017-12-18 RX ADMIN — LIDOCAINE HYDROCHLORIDE 40 MG: 20 INJECTION, SOLUTION EPIDURAL; INFILTRATION; INTRACAUDAL; PERINEURAL at 23:22

## 2017-12-18 RX ADMIN — FOLIC ACID: 5 INJECTION, SOLUTION INTRAMUSCULAR; INTRAVENOUS; SUBCUTANEOUS at 23:30

## 2017-12-18 RX ADMIN — SODIUM CHLORIDE: 900 INJECTION, SOLUTION INTRAVENOUS at 23:05

## 2017-12-18 RX ADMIN — MIDAZOLAM HYDROCHLORIDE 2 MG: 1 INJECTION, SOLUTION INTRAMUSCULAR; INTRAVENOUS at 23:19

## 2017-12-18 RX ADMIN — EPHEDRINE SULFATE 10 MG: 50 INJECTION, SOLUTION INTRAVENOUS at 23:50

## 2017-12-18 RX ADMIN — PIPERACILLIN SODIUM AND TAZOBACTAM SODIUM 3.38 G: .375; 3 INJECTION, POWDER, LYOPHILIZED, FOR SOLUTION INTRAVENOUS at 23:40

## 2017-12-18 RX ADMIN — ROCURONIUM BROMIDE 35 MG: 10 INJECTION, SOLUTION INTRAVENOUS at 23:36

## 2017-12-18 RX ADMIN — Medication 80 MCG: at 23:29

## 2017-12-18 RX ADMIN — FENTANYL CITRATE 100 MCG: 50 INJECTION, SOLUTION INTRAMUSCULAR; INTRAVENOUS at 23:22

## 2017-12-18 RX ADMIN — SUCCINYLCHOLINE CHLORIDE 100 MG: 20 INJECTION INTRAMUSCULAR; INTRAVENOUS at 23:22

## 2017-12-18 NOTE — ED TRIAGE NOTES
Triage:  Pt to ED due to concerns over poor appetite, weakness, and syncope. Family states he had a syncopal event at home earlier today and on the way here the Pt had another syncopal event and since the event the Pt has been disoriented and drowsy. Pt arrives in wheel chair, noted limited interaction with staff.

## 2017-12-18 NOTE — IP AVS SNAPSHOT
[address] 
[phone] Patient: Jayden Rosas MRN: [id number] JAL:[id number] My Medications STOP taking these medications   
 lisinopril-hydroCHLOROthiazide 20-12.5 mg per tablet Commonly known as:  PRINZIDE, ZESTORETIC  
   
  
  
TAKE these medications as instructed Instructions Each Dose to Equal  
 Morning Noon Evening Bedtime  
 acetaminophen 500 mg tablet Commonly known as:  TYLENOL Your last dose was: Your next dose is: Take 500 mg by mouth every six (6) hours as needed for Pain. 500 mg  
    
   
   
   
  
 allopurinol 100 mg tablet Commonly known as:  Beck Marcus Your last dose was: Your next dose is: Take 100 mg by mouth daily. 100 mg  
    
   
   
   
  
 amLODIPine 5 mg tablet Commonly known as:  Gilma Copeland Start taking on:  12/25/2017 Your last dose was: Your next dose is: Take 1 Tab by mouth daily. 5 mg  
    
   
   
   
  
 cholestyramine-sucrose 4 gram powder Commonly known as:  Chidi Spare Your last dose was: Your next dose is: Take 4 g by mouth daily as needed. For diarrheal stool 4 g  
    
   
   
   
  
 folic acid 1 mg tablet Commonly known as:  [employer] Start taking on:  12/25/2017 Your last dose was: Your next dose is: Take 1 Tab by mouth daily. 1 mg  
    
   
   
   
  
 food supplemt, lactose-reduced 0.08 gram-1.5 kcal/mL Liqd Commonly known as:  ENSURE ENLIVE Your last dose was: Your next dose is: Take 1 Can by mouth two (2) times a day. 1 Can  
    
   
   
   
  
 guaiFENesin  mg ER tablet Commonly known as:  [employer] Your last dose was: Your next dose is: Take 1 Tab by mouth every twelve (12) hours.   
 600 mg  
    
   
   
   
  
 lactobacillus acidoph-pectin 75 million cell -100 mg Cap capsule Commonly known as:  ACIDOPHILUS-PECTIN Your last dose was: Your next dose is: Take 1 Cap by mouth daily. 1 Cap  
    
   
   
   
  
 levoFLOXacin 750 mg tablet Commonly known as:  Charbel Sanchez Your last dose was: Your next dose is: Take 1 Tab by mouth daily. 750 mg  
    
   
   
   
  
 metoprolol tartrate 25 mg tablet Commonly known as:  LOPRESSOR Your last dose was: Your next dose is: Take 1 Tab by mouth every twelve (12) hours. 25 mg  
    
   
   
   
  
 metroNIDAZOLE 500 mg tablet Commonly known as:  FLAGYL Your last dose was: Your next dose is: Take 1 Tab by mouth three (3) times daily. 500 mg  
    
   
   
   
  
 potassium chloride SR 20 mEq tablet Commonly known as:  K-TAB Start taking on:  12/25/2017 Your last dose was: Your next dose is: Take 2 Tabs by mouth daily. 40 mEq  
    
   
   
   
  
 therapeutic multivitamin tablet Commonly known as:  [facility] Start taking on:  12/25/2017 Your last dose was: Your next dose is: Take 1 Tab by mouth daily. 1 Tab  
    
   
   
   
  
 thiamine 100 mg tablet Commonly known as:  B-1 Start taking on:  12/25/2017 Your last dose was: Your next dose is: Take 1 Tab by mouth daily. 100 mg  
    
   
   
   
  
 traMADol 50 mg tablet Commonly known as:  ULTRAM  
   
Your last dose was: Your next dose is: Take 1 Tab by mouth every eight (8) hours as needed for Pain (Mod pain). Max Daily Amount: 150 mg.  
 50 mg Where to Get Your Medications Information on where to get these meds will be given to you by the nurse or doctor. ! Ask your nurse or doctor about these medications  
  amLODIPine 5 mg tablet cholestyramine-sucrose 4 gram powder  
 folic acid 1 mg tablet  
 food supplemt, lactose-reduced 0.08 gram-1.5 kcal/mL Liqd  
 guaiFENesin  mg ER tablet  
 lactobacillus acidoph-pectin 75 million cell -100 mg Cap capsule  
 levoFLOXacin 750 mg tablet  
 metoprolol tartrate 25 mg tablet  
 metroNIDAZOLE 500 mg tablet  
 potassium chloride SR 20 mEq tablet  
 therapeutic multivitamin tablet  
 thiamine 100 mg tablet  
 traMADol 50 mg tablet

## 2017-12-18 NOTE — IP AVS SNAPSHOT
[address] 
[phone] Patient: Brandt Tanner MRN: [id number] NHL:[id number] About your hospitalization You were admitted on:  December 18, 2017 You last received care in the:  [place] [id number] You were discharged on:  December 24, 2017 Why you were hospitalized Your primary diagnosis was: Shock (Hcc) Your diagnoses also included:  Lactic Acidosis, Syncope, Arf (Acute Renal Failure) (Hcc), Symptomatic Bradycardia, Pneumatosis Intestinalis Things You Need To Do (next 8 weeks) Follow up with [facility], DO in 2 day(s)  
hospital ff-up on 12/26/17, BMP / Mg recheck due on 12/27/17 Phone:  [phone] Where:   [address] [id number] Follow up with oJsh Calzada MD in 10 day(s) Post-op surgical wound management Phone:  [phone] Where:  [address] [place] [address], [facility] [address] [phone] Discharge Orders None A check babar indicates which time of day the medication should be taken. My Medications STOP taking these medications   
 lisinopril-hydroCHLOROthiazide 20-12.5 mg per tablet Commonly known as:  USHA RODRIGUEZ  
   
  
  
TAKE these medications as instructed Instructions Each Dose to Equal  
 Morning Noon Evening Bedtime  
 acetaminophen 500 mg tablet Commonly known as:  TYLENOL Your last dose was: Your next dose is: Take 500 mg by mouth every six (6) hours as needed for Pain. 500 mg  
    
   
   
   
  
 allopurinol 100 mg tablet Commonly known as:  Dequan Khoury Your last dose was: Your next dose is: Take 100 mg by mouth daily. 100 mg  
    
   
   
   
  
 amLODIPine 5 mg tablet Commonly known as:  Flaquita Fraction Start taking on:  12/25/2017 Your last dose was: Your next dose is: Take 1 Tab by mouth daily. 5 mg  
    
   
   
   
  
 cholestyramine-sucrose 4 gram powder Commonly known as:  Phani Urbano Your last dose was: Your next dose is: Take 4 g by mouth daily as needed. For diarrheal stool 4 g  
    
   
   
   
  
 folic acid 1 mg tablet Commonly known as:  [employer] Start taking on:  12/25/2017 Your last dose was: Your next dose is: Take 1 Tab by mouth daily. 1 mg  
    
   
   
   
  
 food supplemt, lactose-reduced 0.08 gram-1.5 kcal/mL Liqd Commonly known as:  ENSURE ENLIVE Your last dose was: Your next dose is: Take 1 Can by mouth two (2) times a day. 1 Can  
    
   
   
   
  
 guaiFENesin  mg ER tablet Commonly known as:  Donuts Your last dose was: Your next dose is: Take 1 Tab by mouth every twelve (12) hours. 600 mg  
    
   
   
   
  
 lactobacillus acidoph-pectin 75 million cell -100 mg Cap capsule Commonly known as:  ACIDOPHILUS-PECTIN Your last dose was: Your next dose is: Take 1 Cap by mouth daily. 1 Cap  
    
   
   
   
  
 levoFLOXacin 750 mg tablet Commonly known as:  Ganga Florence Your last dose was: Your next dose is: Take 1 Tab by mouth daily. 750 mg  
    
   
   
   
  
 metoprolol tartrate 25 mg tablet Commonly known as:  LOPRESSOR Your last dose was: Your next dose is: Take 1 Tab by mouth every twelve (12) hours. 25 mg  
    
   
   
   
  
 metroNIDAZOLE 500 mg tablet Commonly known as:  FLAGYL Your last dose was: Your next dose is: Take 1 Tab by mouth three (3) times daily. 500 mg  
    
   
   
   
  
 potassium chloride SR 20 mEq tablet Commonly known as:  K-TAB Start taking on:  12/25/2017 Your last dose was: Your next dose is: Take 2 Tabs by mouth daily. 40 mEq therapeutic multivitamin tablet Commonly known as:  [facility] Start taking on:  12/25/2017 Your last dose was: Your next dose is: Take 1 Tab by mouth daily. 1 Tab  
    
   
   
   
  
 thiamine 100 mg tablet Commonly known as:  B-1 Start taking on:  12/25/2017 Your last dose was: Your next dose is: Take 1 Tab by mouth daily. 100 mg  
    
   
   
   
  
 traMADol 50 mg tablet Commonly known as:  ULTRAM  
   
Your last dose was: Your next dose is: Take 1 Tab by mouth every eight (8) hours as needed for Pain (Mod pain). Max Daily Amount: 150 mg.  
 50 mg Where to Get Your Medications Information on where to get these meds will be given to you by the nurse or doctor. ! Ask your nurse or doctor about these medications  
  amLODIPine 5 mg tablet  
 cholestyramine-sucrose 4 gram powder  
 folic acid 1 mg tablet  
 food supplemt, lactose-reduced 0.08 gram-1.5 kcal/mL Liqd  
 guaiFENesin  mg ER tablet  
 lactobacillus acidoph-pectin 75 million cell -100 mg Cap capsule  
 levoFLOXacin 750 mg tablet  
 metoprolol tartrate 25 mg tablet  
 metroNIDAZOLE 500 mg tablet  
 potassium chloride SR 20 mEq tablet  
 therapeutic multivitamin tablet  
 thiamine 100 mg tablet  
 traMADol 50 mg tablet Discharge Instructions [facility]  
[address] [facility], [address] [place] [address] Christina Medina MD 
[phone] Discharge Instructions PATIENT ID: Javi Goldman MRN: [id number] YOB: 1955 DATE OF ADMISSION: 12/18/2017  7:04 PM   
DATE OF DISCHARGE: 12/24/2017 PRIMARY CARE PROVIDER: None DISCHARGING PHYSICIAN: Christina Medina MD   
To contact this individual call [phone]. DISCHARGE DIAGNOSES Acute ischemic bowel, Post-op R LL pneumonia, Alcohol abuse CONSULTATIONS: IP CONSULT TO NEPHROLOGY 
IP CONSULT TO CARDIOLOGY 
IP CONSULT TO HOSPITALIST 
IP CONSULT TO GENERAL SURGERY 
 
PROCEDURES/SURGERIES: Procedure(s): LAPAROTOMY EXPLORATORY PENDING TEST RESULTS:  
At the time of discharge the following test results are still pending: None FOLLOW UP APPOINTMENTS:  
Follow-up Information Follow up With Details Comments Contact Marcelino Thurman DO In 2 days hospital ff-up on 12/26/17, BMP / Mg recheck due on 12/27/17 [address] [place] [address] 
[phone] Fran Agarwal MD In 10 days Post-op surgical wound management [address] [place] [address]  [address] [facility] [address] 
[phone] ADDITIONAL CARE RECOMMENDATIONS: Ambulate, avoid alcohol DIET: GI Lite diet, Ensure 1 can tid ACTIVITY: Activity as tolerated WOUND CARE: Keep surgical scar site dry EQUIPMENT needed: None DISCHARGE MEDICATIONS: 
 See Medication Reconciliation Form · It is important that you take the medication exactly as they are prescribed. · Keep your medication in the bottles provided by the pharmacist and keep a list of the medication names, dosages, and times to be taken in your wallet. · Do not take other medications without consulting your doctor. NOTIFY YOUR PHYSICIAN FOR ANY OF THE FOLLOWING:  
Fever over 101 degrees for 24 hours. Chest pain, shortness of breath, fever, chills, nausea, vomiting, diarrhea, change in mentation, falling, weakness, bleeding. Severe pain or pain not relieved by medications. Or, any other signs or symptoms that you may have questions about. DISPOSITION: 
x  Home With: family OT  PT  [facility]  RN  
  
 SNF/Inpatient Rehab/LTAC Independent/assisted living Hospice Other: CDMP Checked:  
Yes Y Signed:  
Kaitlynn Vega MD 
12/24/2017 12:56 PM 
 
  
  
  
[employer] Announcement  We are excited to announce that we are making your provider's discharge notes available to you in [email or web address]. You will see these notes when they are completed and signed by the physician that discharged you from your recent hospital stay. If you have any questions or concerns about any information you see in [email or web address], please call the Health Information Department where you were seen or reach out to your Primary Care Provider for more information about your plan of care. Introducing [facility]! Rivas Khan introduces [email or web address] patient portal. Now you can access parts of your medical record, email your doctor's office, and request medication refills online. 1. In your internet browser, go to [email or web address] 2. Click on the First Time User? Click Here link in the Sign In box. You will see the New Member Sign Up page. 3. Enter your [email or web address] Access Code exactly as it appears below. You will not need to use this code after youve completed the sign-up process. If you do not sign up before the expiration date, you must request a new code. · [email or web address] Access Code: [email or web address] Expires: 3/18/2018  7:48 PM 
 
4. Enter the last four digits of your Social Security Number (xxxx) and Date of Birth (mm/dd/yyyy) as indicated and click Submit. You will be taken to the next sign-up page. 5. Create a [email or web address] ID. This will be your [email or web address] login ID and cannot be changed, so think of one that is secure and easy to remember. 6. Create a [email or web address] password. You can change your password at any time. 7. Enter your Password Reset Question and Answer. This can be used at a later time if you forget your password. 8. Enter your e-mail address. You will receive e-mail notification when new information is available in [address] 9. Click Sign Up. You can now view and download portions of your medical record. 10. Click the Download Summary menu link to download a portable copy of your medical information. If you have questions, please visit the Frequently Asked Questions section of the MyChart website. Remember, [employer] is NOT to be used for urgent needs. For medical emergencies, dial 911. Now available from your iPhone and Android! Unresulted Labs-Please follow up with your PCP about these lab tests Order Current Status CRYOGLOBULIN, QL In process CULTURE, BLOOD, PAIRED Preliminary result Providers Seen During Your Hospitalization Provider Specialty Primary office phone Deepak Pearl MD Emergency Medicine [phone] Mirian Abdalla MD Family Practice [phone] Regino Win MD Internal Medicine [phone] Goyo Damico MD Internal Medicine [phone] Arcadio Dietz MD Internal Medicine [phone] Your Primary Care Physician (PCP) Primary Care Physician Office Phone Office Fax NONE ** None ** ** None ** You are allergic to the following No active allergies Recent Documentation Height Weight BMI Smoking Status 1.575 m 44.6 kg 17.98 kg/m2 Never Smoker Emergency Contacts Name Discharge Info Relation Home Work Mobile Cassienelson Johnson YES [1] Brother [24] [phone] Eleanor Oh [1] Sister [23] [phone] Patient Belongings The following personal items are in your possession at time of discharge: 
  Dental Appliances: None  Visual Aid: Glasses, With patient Please provide this summary of care documentation to your next provider. Signatures-by signing, you are acknowledging that this After Visit Summary has been reviewed with you and you have received a copy. Patient Signature:  ____________________________________________________________ Date:  ____________________________________________________________  
  
Fadumo Nevarez  Provider Signature: ____________________________________________________________ Date:  ____________________________________________________________

## 2017-12-19 ENCOUNTER — APPOINTMENT (OUTPATIENT)
Dept: GENERAL RADIOLOGY | Age: 62
DRG: 356 | End: 2017-12-19
Attending: ANESTHESIOLOGY
Payer: MEDICARE

## 2017-12-19 PROBLEM — K55.9 ISCHEMIC BOWEL DISEASE (HCC): Status: RESOLVED | Noted: 2017-12-18 | Resolved: 2017-12-19

## 2017-12-19 LAB
ALBUMIN SERPL-MCNC: 2.9 G/DL (ref 3.5–5)
ALBUMIN/GLOB SERPL: 1 {RATIO} (ref 1.1–2.2)
ALP SERPL-CCNC: 53 U/L (ref 45–117)
ALT SERPL-CCNC: 16 U/L (ref 12–78)
ANION GAP SERPL CALC-SCNC: 12 MMOL/L (ref 5–15)
AST SERPL-CCNC: 28 U/L (ref 15–37)
ATRIAL RATE: 96 BPM
BASOPHILS # BLD: 0 K/UL (ref 0–0.1)
BASOPHILS NFR BLD: 0 % (ref 0–1)
BILIRUB SERPL-MCNC: 0.2 MG/DL (ref 0.2–1)
BUN SERPL-MCNC: 38 MG/DL (ref 6–20)
BUN/CREAT SERPL: 13 (ref 12–20)
CALCIUM SERPL-MCNC: 7.2 MG/DL (ref 8.5–10.1)
CALCULATED P AXIS, ECG09: 71 DEGREES
CALCULATED R AXIS, ECG10: 67 DEGREES
CALCULATED T AXIS, ECG11: 45 DEGREES
CHLORIDE SERPL-SCNC: 112 MMOL/L (ref 97–108)
CO2 SERPL-SCNC: 16 MMOL/L (ref 21–32)
CREAT SERPL-MCNC: 3 MG/DL (ref 0.7–1.3)
DIAGNOSIS, 93000: NORMAL
DIFFERENTIAL METHOD BLD: ABNORMAL
EOSINOPHIL # BLD: 0 K/UL (ref 0–0.4)
EOSINOPHIL NFR BLD: 0 % (ref 0–7)
ERYTHROCYTE [DISTWIDTH] IN BLOOD BY AUTOMATED COUNT: 13.9 % (ref 11.5–14.5)
GLOBULIN SER CALC-MCNC: 3 G/DL (ref 2–4)
GLUCOSE SERPL-MCNC: 125 MG/DL (ref 65–100)
HCT VFR BLD AUTO: 29.6 % (ref 36.6–50.3)
HGB BLD-MCNC: 10.1 G/DL (ref 12.1–17)
LYMPHOCYTES # BLD: 0.4 K/UL (ref 0.8–3.5)
LYMPHOCYTES NFR BLD: 5 % (ref 12–49)
MAGNESIUM SERPL-MCNC: 1.5 MG/DL (ref 1.6–2.4)
MCH RBC QN AUTO: 30.7 PG (ref 26–34)
MCHC RBC AUTO-ENTMCNC: 34.1 G/DL (ref 30–36.5)
MCV RBC AUTO: 90 FL (ref 80–99)
MONOCYTES # BLD: 0.2 K/UL (ref 0–1)
MONOCYTES NFR BLD: 3 % (ref 5–13)
NEUTS SEG # BLD: 7 K/UL (ref 1.8–8)
NEUTS SEG NFR BLD: 92 % (ref 32–75)
P-R INTERVAL, ECG05: 144 MS
PHOSPHATE SERPL-MCNC: 4.8 MG/DL (ref 2.6–4.7)
PLATELET # BLD AUTO: 116 K/UL (ref 150–400)
POTASSIUM SERPL-SCNC: 4.7 MMOL/L (ref 3.5–5.1)
PROT SERPL-MCNC: 5.9 G/DL (ref 6.4–8.2)
Q-T INTERVAL, ECG07: 352 MS
QRS DURATION, ECG06: 66 MS
QTC CALCULATION (BEZET), ECG08: 444 MS
RBC # BLD AUTO: 3.29 M/UL (ref 4.1–5.7)
RBC MORPH BLD: ABNORMAL
SODIUM SERPL-SCNC: 140 MMOL/L (ref 136–145)
VENTRICULAR RATE, ECG03: 96 BPM
WBC # BLD AUTO: 7.6 K/UL (ref 4.1–11.1)

## 2017-12-19 PROCEDURE — 83735 ASSAY OF MAGNESIUM: CPT | Performed by: SURGERY

## 2017-12-19 PROCEDURE — 65660000000 HC RM CCU STEPDOWN

## 2017-12-19 PROCEDURE — 71010 XR CHEST PORT: CPT

## 2017-12-19 PROCEDURE — 93306 TTE W/DOPPLER COMPLETE: CPT

## 2017-12-19 PROCEDURE — 74011250636 HC RX REV CODE- 250/636: Performed by: SURGERY

## 2017-12-19 PROCEDURE — 85025 COMPLETE CBC W/AUTO DIFF WBC: CPT | Performed by: SURGERY

## 2017-12-19 PROCEDURE — 74011250636 HC RX REV CODE- 250/636: Performed by: HOSPITALIST

## 2017-12-19 PROCEDURE — 36415 COLL VENOUS BLD VENIPUNCTURE: CPT | Performed by: SURGERY

## 2017-12-19 PROCEDURE — 74011250636 HC RX REV CODE- 250/636: Performed by: ANESTHESIOLOGY

## 2017-12-19 PROCEDURE — 74011000250 HC RX REV CODE- 250: Performed by: HOSPITALIST

## 2017-12-19 PROCEDURE — 84100 ASSAY OF PHOSPHORUS: CPT | Performed by: SURGERY

## 2017-12-19 PROCEDURE — 74011250636 HC RX REV CODE- 250/636: Performed by: FAMILY MEDICINE

## 2017-12-19 RX ORDER — OXYCODONE AND ACETAMINOPHEN 5; 325 MG/1; MG/1
1 TABLET ORAL AS NEEDED
Status: DISCONTINUED | OUTPATIENT
Start: 2017-12-19 | End: 2017-12-19 | Stop reason: HOSPADM

## 2017-12-19 RX ORDER — SODIUM CHLORIDE 0.9 % (FLUSH) 0.9 %
5-10 SYRINGE (ML) INJECTION AS NEEDED
Status: DISCONTINUED | OUTPATIENT
Start: 2017-12-19 | End: 2017-12-20 | Stop reason: SDUPTHER

## 2017-12-19 RX ORDER — SODIUM CHLORIDE 0.9 % (FLUSH) 0.9 %
5-10 SYRINGE (ML) INJECTION EVERY 8 HOURS
Status: DISCONTINUED | OUTPATIENT
Start: 2017-12-19 | End: 2017-12-24 | Stop reason: HOSPADM

## 2017-12-19 RX ORDER — FENTANYL CITRATE 50 UG/ML
25 INJECTION, SOLUTION INTRAMUSCULAR; INTRAVENOUS
Status: COMPLETED | OUTPATIENT
Start: 2017-12-19 | End: 2017-12-19

## 2017-12-19 RX ORDER — SODIUM CHLORIDE, SODIUM LACTATE, POTASSIUM CHLORIDE, CALCIUM CHLORIDE 600; 310; 30; 20 MG/100ML; MG/100ML; MG/100ML; MG/100ML
125 INJECTION, SOLUTION INTRAVENOUS CONTINUOUS
Status: DISCONTINUED | OUTPATIENT
Start: 2017-12-19 | End: 2017-12-19 | Stop reason: HOSPADM

## 2017-12-19 RX ORDER — SODIUM CHLORIDE 0.9 % (FLUSH) 0.9 %
5-10 SYRINGE (ML) INJECTION AS NEEDED
Status: DISCONTINUED | OUTPATIENT
Start: 2017-12-19 | End: 2017-12-19 | Stop reason: HOSPADM

## 2017-12-19 RX ORDER — MORPHINE SULFATE 10 MG/ML
2 INJECTION, SOLUTION INTRAMUSCULAR; INTRAVENOUS
Status: DISCONTINUED | OUTPATIENT
Start: 2017-12-19 | End: 2017-12-19 | Stop reason: HOSPADM

## 2017-12-19 RX ORDER — ONDANSETRON 2 MG/ML
4 INJECTION INTRAMUSCULAR; INTRAVENOUS AS NEEDED
Status: DISCONTINUED | OUTPATIENT
Start: 2017-12-19 | End: 2017-12-19 | Stop reason: HOSPADM

## 2017-12-19 RX ORDER — ACETAMINOPHEN 10 MG/ML
1000 INJECTION, SOLUTION INTRAVENOUS EVERY 8 HOURS
Status: DISCONTINUED | OUTPATIENT
Start: 2017-12-19 | End: 2017-12-20

## 2017-12-19 RX ORDER — MORPHINE SULFATE 2 MG/ML
2 INJECTION, SOLUTION INTRAMUSCULAR; INTRAVENOUS
Status: DISCONTINUED | OUTPATIENT
Start: 2017-12-19 | End: 2017-12-24 | Stop reason: HOSPADM

## 2017-12-19 RX ORDER — SODIUM CHLORIDE 9 MG/ML
1000 INJECTION, SOLUTION INTRAVENOUS CONTINUOUS
Status: DISCONTINUED | OUTPATIENT
Start: 2017-12-19 | End: 2017-12-19

## 2017-12-19 RX ORDER — SODIUM CHLORIDE 0.9 % (FLUSH) 0.9 %
5-10 SYRINGE (ML) INJECTION EVERY 8 HOURS
Status: DISCONTINUED | OUTPATIENT
Start: 2017-12-19 | End: 2017-12-20 | Stop reason: SDUPTHER

## 2017-12-19 RX ORDER — MIDAZOLAM HYDROCHLORIDE 1 MG/ML
0.5 INJECTION, SOLUTION INTRAMUSCULAR; INTRAVENOUS
Status: DISCONTINUED | OUTPATIENT
Start: 2017-12-19 | End: 2017-12-19 | Stop reason: HOSPADM

## 2017-12-19 RX ORDER — NEOSTIGMINE METHYLSULFATE 1 MG/ML
INJECTION INTRAVENOUS AS NEEDED
Status: DISCONTINUED | OUTPATIENT
Start: 2017-12-19 | End: 2017-12-19 | Stop reason: HOSPADM

## 2017-12-19 RX ORDER — SODIUM CHLORIDE 0.9 % (FLUSH) 0.9 %
5-10 SYRINGE (ML) INJECTION AS NEEDED
Status: DISCONTINUED | OUTPATIENT
Start: 2017-12-19 | End: 2017-12-24 | Stop reason: HOSPADM

## 2017-12-19 RX ORDER — DIPHENHYDRAMINE HYDROCHLORIDE 50 MG/ML
12.5 INJECTION, SOLUTION INTRAMUSCULAR; INTRAVENOUS AS NEEDED
Status: DISCONTINUED | OUTPATIENT
Start: 2017-12-19 | End: 2017-12-19 | Stop reason: HOSPADM

## 2017-12-19 RX ORDER — GLYCOPYRROLATE 0.2 MG/ML
INJECTION INTRAMUSCULAR; INTRAVENOUS AS NEEDED
Status: DISCONTINUED | OUTPATIENT
Start: 2017-12-19 | End: 2017-12-19 | Stop reason: HOSPADM

## 2017-12-19 RX ADMIN — MORPHINE SULFATE 2 MG: 10 INJECTION, SOLUTION INTRAMUSCULAR; INTRAVENOUS at 02:41

## 2017-12-19 RX ADMIN — FENTANYL CITRATE 25 MCG: 50 INJECTION, SOLUTION INTRAMUSCULAR; INTRAVENOUS at 01:29

## 2017-12-19 RX ADMIN — MORPHINE SULFATE 2 MG: 10 INJECTION, SOLUTION INTRAMUSCULAR; INTRAVENOUS at 04:18

## 2017-12-19 RX ADMIN — FENTANYL CITRATE 25 MCG: 50 INJECTION, SOLUTION INTRAMUSCULAR; INTRAVENOUS at 01:10

## 2017-12-19 RX ADMIN — SODIUM CHLORIDE 125 ML/HR: 900 INJECTION, SOLUTION INTRAVENOUS at 08:33

## 2017-12-19 RX ADMIN — ACETAMINOPHEN 1000 MG: 10 INJECTION, SOLUTION INTRAVENOUS at 08:21

## 2017-12-19 RX ADMIN — FENTANYL CITRATE 25 MCG: 50 INJECTION, SOLUTION INTRAMUSCULAR; INTRAVENOUS at 01:39

## 2017-12-19 RX ADMIN — MORPHINE SULFATE 2 MG: 10 INJECTION, SOLUTION INTRAMUSCULAR; INTRAVENOUS at 06:50

## 2017-12-19 RX ADMIN — Medication 10 ML: at 21:36

## 2017-12-19 RX ADMIN — MORPHINE SULFATE 2 MG: 10 INJECTION, SOLUTION INTRAMUSCULAR; INTRAVENOUS at 07:25

## 2017-12-19 RX ADMIN — ACETAMINOPHEN 1000 MG: 10 INJECTION, SOLUTION INTRAVENOUS at 15:30

## 2017-12-19 RX ADMIN — GLYCOPYRROLATE 0.6 MG: 0.2 INJECTION INTRAMUSCULAR; INTRAVENOUS at 00:11

## 2017-12-19 RX ADMIN — MORPHINE SULFATE 2 MG: 10 INJECTION, SOLUTION INTRAMUSCULAR; INTRAVENOUS at 05:58

## 2017-12-19 RX ADMIN — FOLIC ACID 125 ML: 5 INJECTION, SOLUTION INTRAMUSCULAR; INTRAVENOUS; SUBCUTANEOUS at 00:41

## 2017-12-19 RX ADMIN — DEXTROSE MONOHYDRATE: 5 INJECTION, SOLUTION INTRAVENOUS at 21:36

## 2017-12-19 RX ADMIN — FENTANYL CITRATE 25 MCG: 50 INJECTION, SOLUTION INTRAMUSCULAR; INTRAVENOUS at 02:36

## 2017-12-19 RX ADMIN — NEOSTIGMINE METHYLSULFATE 3 MG: 1 INJECTION INTRAVENOUS at 00:11

## 2017-12-19 RX ADMIN — Medication 10 ML: at 21:37

## 2017-12-19 RX ADMIN — MORPHINE SULFATE 2 MG: 2 INJECTION, SOLUTION INTRAMUSCULAR; INTRAVENOUS at 15:33

## 2017-12-19 NOTE — PERIOP NOTES
Sharmila Kiser - OUT REPORT:    Verbal report given to [facility] RN(name) on Theron Cons  being transferred to [facility](unit) for routine post - op       Report consisted of patients Situation, Background, Assessment and   Recommendations(SBAR). Time Pre op antibiotic given:Y  Anesthesia Stop time: N  [place] Present on Transfer to floor:Y  Order for [place] on Chart:Y    Information from the following report(s) SBAR was reviewed with the receiving nurse. Opportunity for questions and clarification was provided. Is the patient on 02? NO       L/Min        Other     Is the patient on a monitor? YES    Is the nurse transporting with the patient? YES    Surgical Waiting Area notified of patient's transfer from PACU?  YES      The following personal items collected during your admission accompanied patient upon transfer:   Dental Appliance: Dental Appliances: None  Vision: Visual Aid: Glasses  Hearing Aid:    Jewelry:    Clothing:    Other Valuables:    Valuables sent to [facility]:      [facility]

## 2017-12-19 NOTE — ED PROVIDER NOTES
HPI Comments: 65 y.o. male with past medical history significant for HTN and gout who presents from home with chief complaint of abdominal pain. Pt states that he has had abdominal pain for the past couple days. Pt states that he has been having diarrhea as well for the last 2 days but denies any blood in his diarrhea. Today while at home he had a syncopal event and was brought to ED by his sister. Pt denies any chest pain, HA, or SOB. There are no other acute medical concerns at this time. PCP: None    Note written by Edmundo Rider, as dictated by Chay Barrios MD 7:10 PM      The history is provided by the patient. No  was used. Past Medical History:   Diagnosis Date    Gout     Hypertension        History reviewed. No pertinent surgical history. History reviewed. No pertinent family history. Social History     Social History    Marital status: SINGLE     Spouse name: N/A    Number of children: N/A    Years of education: N/A     Occupational History    Not on file. Social History Main Topics    Smoking status: Never Smoker    Smokeless tobacco: Never Used    Alcohol use Yes      Comment: Everyday    Drug use: No    Sexual activity: Not on file     Other Topics Concern    Not on file     Social History Narrative         ALLERGIES: Review of patient's allergies indicates no known allergies. Review of Systems   Constitutional: Negative for diaphoresis and fever. HENT: Negative for facial swelling. Eyes: Negative for visual disturbance. Respiratory: Negative for cough and shortness of breath. Cardiovascular: Negative for chest pain. Gastrointestinal: Positive for abdominal pain and diarrhea. Negative for blood in stool. Genitourinary: Negative for dysuria. Musculoskeletal: Negative for joint swelling. Skin: Negative for rash. Neurological: Negative for headaches. Hematological: Negative for adenopathy. Psychiatric/Behavioral: Negative for suicidal ideas. There were no vitals filed for this visit. Physical Exam   Constitutional: He is oriented to person, place, and time. He appears cachectic. No distress. Thin   HENT:   Head: Normocephalic and atraumatic. Mouth/Throat: Oropharynx is clear and moist. Mucous membranes are dry. Eyes: Pupils are equal, round, and reactive to light. Neck: Normal range of motion. Neck supple. Cardiovascular: Normal rate, regular rhythm, normal heart sounds and intact distal pulses. Pulmonary/Chest: Effort normal and breath sounds normal. No respiratory distress. Abdominal: Soft. Bowel sounds are normal. He exhibits no distension. There is no tenderness. Musculoskeletal: Normal range of motion. He exhibits no edema. Neurological: He is alert and oriented to person, place, and time. Skin: Skin is warm and dry. Poor capillary refill   Nursing note and vitals reviewed. Note written by Edmundo Drummond, as dictated by Jonn Gilbert MD 7:12 PM      MDM  Number of Diagnoses or Management Options  Diagnosis management comments: A:  62yo M with syncope and diarrhea for 2 days. Found to be profoundly bradycardic and hypotensive in ED. Pt awake and alert. Cool extremities. Faint pulses. Low temp. Pt with h/o htn for which he takes medication but does not know names. Denies any overdoses. No other cardiac history. DDx:  Heart block, electrolyte abnormality, hypothermia, dehydration, sepsis    P:  Atropine given for bradycardia with improvement in HR and BP. ECG  cxr  Labs  Blood cultures/lactate  Stool studies  IVF  reassess    ED Course       Procedures    ED EKG interpretation (after atropine):  Rhythm: normal sinus rhythm. Rate (approx.): 96. Axis: normal.  ST segment:  No concerning ST elevations or depressions. This EKG was interpreted by Jonn Gilbert MD,ED Provider. CBC unremarkable.   Mb=415  BUN/Cr=45/5.01  K=4.3  Trop neg  Lactate:  3.5    Portable Chest Xray:  Portable chest xray showing no signs of acute disease. This CXR was interpreted by Ninoska Hubbard MD, ED Provider. 8:02 PM  HR maintained above 90 with improved BP following atropine, IVF, and warming. Will consult nephrology for ARF and cardiology regarding bradycardia. Plan to admit to hospitalist     8:04 PM  I have spent *45** minutes of critical care time involved in lab review, consultations with specialist, family decision-making, and documentation. During this entire length of time I was immediately available to the patient and/or family. CONSULT NOTE:  8:12 PM Ninoska Hubbard MD spoke with Dr. Jeremy Gilbert, Consult for Cardiology. Discussed available diagnostic tests and clinical findings. He is in agreement with care plans as outlined. He recommends another dose of atropine if his pulse drops again or as needed. CONSULT NOTE:  8:13 PM Ninoska Hubbard MD spoke with Dr. Laly Bermeo, Consult for Nephrology. Discussed available diagnostic tests and clinical findings. He is in agreement with care plans as outlined IV fluids and chung catheter. They will see him in the morning    8:16 PM  Pt with hypotension and elevated creatinine but no evidence of infectious source at this time. Code Sepsis not called. I suspect hypotension and elevated Cr due to profound hypovolemia and acute renal failure. Will activate code sepsis if a potential source declares itself. 10:29 PM  CT Abd Pelvis without contrast:  IMPRESSION: Thickened small bowel loops with pneumatosis intestinalis. The  appearance suggests possible ischemic bowel. Dr. Lukas Junior called for possible ischemic bowel. Dr. Lukas Junior saw pt in ED with plans to go to OR for ex lap. Dr. Andressa Wynn admitting patient.

## 2017-12-19 NOTE — ROUTINE PROCESS
TRANSFER - IN REPORT:    Verbal report received from Emma Miller RN(name) on Lizzeth Thomas  being received from [facility]) for routine post - op      Report consisted of patients Situation, Background, Assessment and   Recommendations(SBAR). Information from the following report(s) SBAR, ED Summary, Procedure Summary, Intake/Output, Recent Results, Med Rec Status and Cardiac Rhythm NSR/sinus mirlande was reviewed with the receiving nurse. Opportunity for questions and clarification was provided. Assessment completed upon patients arrival to unit and care assumed.

## 2017-12-19 NOTE — CONSULTS
Cardiology Consult Note    CC: bradycardia and hypotension  Reason for consult:  Bradycardia and hypotension  Requesting MD:  Dr. Jada Carter     Subjective:      Date of  Admission: 12/18/2017  7:04 PM     Admission type:Emergency    Jayden Rosas is a 65 y.o. male admitted for Shock ([facility]  [facility] (acute renal failure) [facility]  Symptomatic bradycardia  Lactic acidosis  Syncope  UNKNOWN. Patient complains of apparent presyncope at home and on presentation to ER his HR and BP were low in setting of nausea and responded to IV Atropine. Now he just had exploratory lap which did not find any malignancy. His renal failure is improving this am already with IVF. Overnight his HR and BP stayed up fine, not requiring another dose of IV Atropine. He denies any recent intermittent dizziness or previous cardiac problems. He has had HTN and took his BP regimen religiously. Denies any exertional CP or SOB. He has had recent abdominal discomfort with nausea and appetite loss to the point of weight loss. Patient Active Problem List    Diagnosis Date Noted    Lactic acidosis 12/18/2017    Shock ([facility] 12/18/2017    Syncope 12/18/2017    ARF (acute renal failure) ([facility] 12/18/2017    Symptomatic bradycardia 12/18/2017    Pneumatosis intestinalis 12/18/2017      None  Past Medical History:   Diagnosis Date    Gout     Hypertension       History reviewed. No pertinent surgical history. No Known Allergies   History reviewed. No pertinent family history.    Current Facility-Administered Medications   Medication Dose Route Frequency    lactated Ringers infusion  125 mL/hr IntraVENous CONTINUOUS    0.9% sodium chloride infusion 1,000 mL  1,000 mL IntraVENous CONTINUOUS    sodium chloride (NS) flush 5-10 mL  5-10 mL IntraVENous PRN    oxyCODONE-acetaminophen (PERCOCET) 5-325 mg per tablet 1 Tab  1 Tab Oral PRN    morphine injection 2 mg  2 mg IntraVENous Multiple    ondansetron (ZOFRAN) injection 4 mg  4 mg IntraVENous PRN    midazolam (VERSED) injection 0.5 mg  0.5 mg IntraVENous Q5MIN PRN    diphenhydrAMINE (BENADRYL) injection 12.5 mg  12.5 mg IntraVENous PRN    meperidine (DEMEROL) injection 12.5 mg  12.5 mg IntraVENous ONCE    acetaminophen (OFIRMEV) infusion 1,000 mg  1,000 mg IntraVENous Q8H    0.9% sodium chloride infusion  125 mL/hr IntraVENous CONTINUOUS    sodium chloride (NS) flush 5-10 mL  5-10 mL IntraVENous Q8H    sodium chloride (NS) flush 5-10 mL  5-10 mL IntraVENous PRN    0.9% sodium chloride 1,000 mL with mvi, adult no. 4 with vit K 10 mL, thiamine 739 mg, folic acid 1 mg infusion   IntraVENous Q24H    LORazepam (ATIVAN) injection 1 mg  1 mg IntraVENous Q4H PRN    LORazepam (ATIVAN) injection 2 mg  2 mg IntraVENous Q6H PRN    piperacillin-tazobactam (ZOSYN) 6.75 g in 0.9% sodium chloride 500 mL infusion  6.75 g IntraVENous Q24H    0.9% sodium chloride infusion  50 mL/hr IntraVENous CONTINUOUS    fentaNYL citrate (PF) injection 50 mcg  50 mcg IntraVENous PRN    midazolam (VERSED) injection 1 mg  1 mg IntraVENous PRN    piperacillin-tazobactam (ZOSYN) 3.375 gram injection        ADDaptor            Prior to Admission Medications:  Prior to Admission medications    Medication Sig Start Date End Date Taking? Authorizing Provider   allopurinol (ZYLOPRIM) 100 mg tablet Take 100 mg by mouth daily. Socrates Diego MD   acetaminophen (TYLENOL) 500 mg tablet Take 500 mg by mouth every six (6) hours as needed for Pain. Socrates Diego MD   lisinopril-hydroCHLOROthiazide (PRINZIDE, ZESTORETIC) 20-12.5 mg per tablet Take  by mouth daily.     Socartes Diego MD        Review of Symptoms:  Except as noted in HPI, patient denies recent fever or chills, nausea, vomiting, diarrhea, hemoptysis, hematemesis, dysuria, myalgias, focal neurologic symptoms, ecchymosis, angioedema, odynophagia, dysphagia, sore throat, earache,rash, melena, hematochezia, depression, GERD, cold intolerance, petechia, bleeding gums, or significant weight loss.    A comprehensive review of systems was negative except for that written in the HPI. Subjective:    24 hr VS reviewed, overall VSSAF  Temp (24hrs), Av.8 °F (36.6 °C), Min:96.2 °F (35.7 °C), Max:99.1 °F (37.3 °C)    Patient Vitals for the past 8 hrs:   Pulse   17 0830 61   17 0800 (!) 59   17 0700 63   17 0600 66   17 0500 72   17 0400 64   17 0335 65   17 0330 65   17 0300 68   17 0230 (!) 59   17 0215 62   17 0200 (!) 59   17 0145 67   17 0130 65    Patient Vitals for the past 8 hrs:   Resp   17 0830 15   17 0800 17   17 0700 15   17 0600 16   17 0500 16   17 0400 15   17 0335 13   17 0330 13   17 0300 13   17 0230 15   17 0215 14   17 0200 14   17 0145 14   17 0130 14    Patient Vitals for the past 8 hrs:   BP   17 0800 132/84   17 0700 131/79   17 0600 120/81   17 0500 111/73   17 0400 117/75   17 0335 119/80   17 0230 114/71   17 0215 110/67   17 0200 114/65   17 0145 112/70   17 0130 111/87          Intake/Output Summary (Last 24 hours) at 17 0919  Last data filed at 17 7122   Gross per 24 hour   Intake             1803 ml   Output             1125 ml   Net              678 ml         Physical Exam (complete single organ system exam)      Visit Vitals    /83    Pulse 60    Temp 97.9 °F (36.6 °C)    Resp 17    Ht 5' 2\" (1.575 m)    Wt 41 kg (90 lb 6.2 oz)    SpO2 96%    BMI 16.53 kg/m2     General Appearance:  Well developed, well nourished,alert and oriented x 3, and individual in no acute distress. Ears/Nose/Mouth/Throat:   Hearing grossly normal. Dry mucosa         Neck: Supple. Chest:   Lungs clear to auscultation bilaterally. Cardiovascular:  Regular rate and rhythm, S1, S2 normal, no murmur.    Abdomen:   Soft, non-tender, bowel sounds are active. Extremities: No edema bilaterally. Skin: Warm and dry. Cardiographics    Telemetry: normal sinus rhythm  ECG: normal EKG, normal sinus rhythm, unchanged from previous tracings  Echocardiogram: Not done    Labs: Recent Results (from the past 24 hour(s))   EKG, 12 LEAD, INITIAL    Collection Time: 12/18/17  7:13 PM   Result Value Ref Range    Ventricular Rate 96 BPM    Atrial Rate 96 BPM    P-R Interval 144 ms    QRS Duration 66 ms    Q-T Interval 352 ms    QTC Calculation (Bezet) 444 ms    Calculated P Axis 71 degrees    Calculated R Axis 67 degrees    Calculated T Axis 45 degrees    Diagnosis       Normal sinus rhythm  When compared with ECG of 21-JUL-2017 10:31,  Vent. rate has increased BY  35 BPM  QT has lengthened     CBC W/O DIFF    Collection Time: 12/18/17  7:17 PM   Result Value Ref Range    WBC 9.8 4.1 - 11.1 K/uL    RBC 4.21 4.10 - 5.70 M/uL    HGB 13.1 12.1 - 17.0 g/dL    HCT 38.5 36.6 - 50.3 %    MCV 91.4 80.0 - 99.0 FL    MCH 31.1 26.0 - 34.0 PG    MCHC 34.0 30.0 - 36.5 g/dL    RDW 14.1 11.5 - 14.5 %    PLATELET 295 036 - 232 K/uL   METABOLIC PANEL, COMPREHENSIVE    Collection Time: 12/18/17  7:17 PM   Result Value Ref Range    Sodium 130 (L) 136 - 145 mmol/L    Potassium 4.3 3.5 - 5.1 mmol/L    Chloride 97 97 - 108 mmol/L    CO2 19 (L) 21 - 32 mmol/L    Anion gap 14 5 - 15 mmol/L    Glucose 219 (H) 65 - 100 mg/dL    BUN 45 (H) 6 - 20 MG/DL    Creatinine 5.01 (H) 0.70 - 1.30 MG/DL    BUN/Creatinine ratio 9 (L) 12 - 20      GFR est AA 14 (L) >60 ml/min/1.73m2    GFR est non-AA 12 (L) >60 ml/min/1.73m2    Calcium 9.1 8.5 - 10.1 MG/DL    Bilirubin, total 0.3 0.2 - 1.0 MG/DL    ALT (SGPT) 24 12 - 78 U/L    AST (SGOT) 32 15 - 37 U/L    Alk.  phosphatase 87 45 - 117 U/L    Protein, total 8.2 6.4 - 8.2 g/dL    Albumin 4.1 3.5 - 5.0 g/dL    Globulin 4.1 (H) 2.0 - 4.0 g/dL    A-G Ratio 1.0 (L) 1.1 - 2.2     MAGNESIUM    Collection Time: 12/18/17  7:17 PM   Result Value Ref Range    Magnesium 1.8 1.6 - 2.4 mg/dL   PHOSPHORUS    Collection Time: 12/18/17  7:17 PM   Result Value Ref Range    Phosphorus 7.3 (H) 2.6 - 4.7 MG/DL   TROPONIN I    Collection Time: 12/18/17  7:17 PM   Result Value Ref Range    Troponin-I, Qt. <0.04 <0.05 ng/mL   CK W/ CKMB & INDEX    Collection Time: 12/18/17  7:17 PM   Result Value Ref Range     (H) 39 - 308 U/L    CK - MB <1.0 <3.6 NG/ML    CK-MB Index Cannot be calculated 0 - 2.5     LACTIC ACID    Collection Time: 12/18/17  7:24 PM   Result Value Ref Range    Lactic acid 3.5 (HH) 0.4 - 2.0 MMOL/L   CULTURE, BLOOD, PAIRED    Collection Time: 12/18/17  7:24 PM   Result Value Ref Range    Special Requests: NO SPECIAL REQUESTS      Culture result: NO GROWTH AFTER 9 HOURS     URINALYSIS W/MICROSCOPIC    Collection Time: 12/18/17  8:31 PM   Result Value Ref Range    Color YELLOW/STRAW      Appearance CLOUDY (A) CLEAR      Specific gravity 1.012 1.003 - 1.030      pH (UA) 5.5 5.0 - 8.0      Protein 100 (A) NEG mg/dL    Glucose 100 (A) NEG mg/dL    Ketone NEGATIVE  NEG mg/dL    Bilirubin NEGATIVE  NEG      Blood LARGE (A) NEG      Urobilinogen 0.2 0.2 - 1.0 EU/dL    Nitrites NEGATIVE  NEG      Leukocyte Esterase NEGATIVE  NEG      WBC 0-4 0 - 4 /hpf    RBC 0-5 0 - 5 /hpf    Epithelial cells MODERATE (A) FEW /lpf    Bacteria 1+ (A) NEG /hpf    Mucus 4+ (A) NEG /lpf   URINE CULTURE HOLD SAMPLE    Collection Time: 12/18/17  8:31 PM   Result Value Ref Range    Urine culture hold URINE ON HOLD IN MICROBIOLOGY DEPT FOR 3 DAYS     POC G3 - PUL    Collection Time: 12/18/17 10:23 PM   Result Value Ref Range    pH (POC) 7.416 7.35 - 7.45      pCO2 (POC) 20.8 (L) 35.0 - 45.0 MMHG    pO2 (POC) 102 (H) 80 - 100 MMHG    HCO3 (POC) 13.4 (L) 22 - 26 MMOL/L    sO2 (POC) 98 (H) 92 - 97 %    Base deficit (POC) 11 mmol/L    Site LEFT BRACHIAL      Device: ROOM AIR      Allens test (POC) YES      Specimen type (POC) ARTERIAL     LACTIC ACID    Collection Time: 12/18/17 10:36 PM Result Value Ref Range    Lactic acid 1.5 0.4 - 2.0 MMOL/L   METABOLIC PANEL, COMPREHENSIVE    Collection Time: 12/19/17  4:43 AM   Result Value Ref Range    Sodium 140 136 - 145 mmol/L    Potassium 4.7 3.5 - 5.1 mmol/L    Chloride 112 (H) 97 - 108 mmol/L    CO2 16 (L) 21 - 32 mmol/L    Anion gap 12 5 - 15 mmol/L    Glucose 125 (H) 65 - 100 mg/dL    BUN 38 (H) 6 - 20 MG/DL    Creatinine 3.00 (H) 0.70 - 1.30 MG/DL    BUN/Creatinine ratio 13 12 - 20      GFR est AA 26 (L) >60 ml/min/1.73m2    GFR est non-AA 21 (L) >60 ml/min/1.73m2    Calcium 7.2 (L) 8.5 - 10.1 MG/DL    Bilirubin, total 0.2 0.2 - 1.0 MG/DL    ALT (SGPT) 16 12 - 78 U/L    AST (SGOT) 28 15 - 37 U/L    Alk. phosphatase 53 45 - 117 U/L    Protein, total 5.9 (L) 6.4 - 8.2 g/dL    Albumin 2.9 (L) 3.5 - 5.0 g/dL    Globulin 3.0 2.0 - 4.0 g/dL    A-G Ratio 1.0 (L) 1.1 - 2.2     PHOSPHORUS    Collection Time: 12/19/17  4:43 AM   Result Value Ref Range    Phosphorus 4.8 (H) 2.6 - 4.7 MG/DL   MAGNESIUM    Collection Time: 12/19/17  4:43 AM   Result Value Ref Range    Magnesium 1.5 (L) 1.6 - 2.4 mg/dL   CBC WITH AUTOMATED DIFF    Collection Time: 12/19/17  4:43 AM   Result Value Ref Range    WBC 7.6 4.1 - 11.1 K/uL    RBC 3.29 (L) 4.10 - 5.70 M/uL    HGB 10.1 (L) 12.1 - 17.0 g/dL    HCT 29.6 (L) 36.6 - 50.3 %    MCV 90.0 80.0 - 99.0 FL    MCH 30.7 26.0 - 34.0 PG    MCHC 34.1 30.0 - 36.5 g/dL    RDW 13.9 11.5 - 14.5 %    PLATELET 192 (L) 239 - 400 K/uL    NEUTROPHILS 92 (H) 32 - 75 %    LYMPHOCYTES 5 (L) 12 - 49 %    MONOCYTES 3 (L) 5 - 13 %    EOSINOPHILS 0 0 - 7 %    BASOPHILS 0 0 - 1 %    ABS. NEUTROPHILS 7.0 1.8 - 8.0 K/UL    ABS. LYMPHOCYTES 0.4 (L) 0.8 - 3.5 K/UL    ABS. MONOCYTES 0.2 0.0 - 1.0 K/UL    ABS. EOSINOPHILS 0.0 0.0 - 0.4 K/UL    ABS.  BASOPHILS 0.0 0.0 - 0.1 K/UL    DF SMEAR SCANNED      RBC COMMENTS NORMOCYTIC, NORMOCHROMIC          Assessment:     Assessment:   Bradycardia; due to vagal  Hypotension; due mostly to dehydration/accentuated medication effect in setting of acute renal failure; improved  ARF/CKD; continue IVF  Strongly positive family history of CV diseases  HTN     Plan:   Tele  IVF  Echo; formal result is pending but on preliminary it looks good  Hold ACEI/HCTZ, his home antihypertensive regimen  Outpatient stress test     Jose Raul Arellano MD

## 2017-12-19 NOTE — ROUTINE PROCESS
Primary Nurse Luca Cunningham and Cely Wick RN performed a dual skin assessment on this patient No impairment noted  Jesus score is 18    Patient skin is clean dry and intact with a midline incision.

## 2017-12-19 NOTE — ROUTINE PROCESS
TRANSFER - IN REPORT:    Verbal report received from Smooth Trinidad RN(name) on [facility]  being received from ED(unit) for routine post - op      Report consisted of patients Situation, Background, Assessment and   Recommendations(SBAR). Information from the following report(s) SBAR, ED Summary, Intake/Output, MAR and Cardiac Rhythm NSR. was reviewed with the receiving nurse. Opportunity for questions and clarification was provided. Assessment completed upon patients arrival to unit and care assumed.

## 2017-12-19 NOTE — PROGRESS NOTES
Problem: Pressure Injury - Risk of  Goal: *Prevention of pressure ulcer  Outcome: Progressing Towards Goal  Moves in bed often and skin is clean dry and intact     Problem: Falls - Risk of  Goal: *Absence of Falls  Document Scott Fall Risk and appropriate interventions in the flowsheet. Outcome: Progressing Towards Goal  Fall Risk Interventions:  Mobility Interventions: Bed/chair exit alarm         Medication Interventions: Bed/chair exit alarm, Teach patient to arise slowly         History of Falls Interventions: Bed/chair exit alarm, Investigate reason for fall, Room close to nurse's station    Patient is on a bed alarm and wearing gripper socks     Problem: Surgical Pathway Day of Surgery  Goal: *No signs and symptoms of infection or wound complications  Outcome: Progressing Towards Goal  Patient shows no signs or symptoms of infection at this time   Goal: *Optimal pain control at patient's stated goal  Outcome: Progressing Towards Goal  Patient pain is being well controlled     Goal: *Adequate urinary output (equal to or greater than 30 milliliters/hour)  Ambulatory Surgery patients voiding without difficulty.   Outcome: Progressing Towards Goal  Patient making adequate urine out of his chung   Goal: *Tolerating diet  Outcome: Progressing Towards Goal  Patient is tolerating ice chips  Goal: *Demonstrates progressive activity  Outcome: Progressing Towards Goal  Patient walked in the room with an unsteady gait

## 2017-12-19 NOTE — OP NOTES
[facility] CARE OP NOTE    Irina Root.  MR#: [id number]  : 1955  ACCOUNT #: [de-identified]   DATE OF SERVICE: 2017    PREOPERATIVE DIAGNOSIS:  Acidosis with abdominal pain. POSTOPERATIVE DIAGNOSIS:  Acidosis with abdominal pain. PROCEDURE PERFORMED: Exploratory laparotomy. ATTENDING SURGEON:  Asad Marie M.D. ANESTHESIA:  General endotracheal.    DRAINS:  None. COUNTS:  Sponge count correct. Needle count correct. SPECIMEN:  None. ESTIMATED BLOOD LOSS:  Minimal.    COMPLICATIONS:  None. INDICATIONS:  The patient is a 73-year-old -American male with multiple medical problems, brought to the [facility] Emergency Department after two syncopal episodes. He was noted to be hypothermic, hypotensive, and acidotic. Resuscitation was initiated. CT scan revealed thickened loops of bowel with pneumatosis intestinalis. On exam, he has mild but diffuse pain with palpation. His findings are worrisome for intestinal ischemia. He is emergently taken to the operating room today for exploration. FINDINGS:  No ischemic bowel or other abnormality. DESCRIPTION OF PROCEDURE:  The patient was identified as the correct patient in the preoperative holding area and informed consent was confirmed. After answering the patient's remaining questions and those of his brother, he was taken to the operating room and placed on the operating room table in the supine position. Sequential compression devices were placed on both lower extremities. Following the uneventful initiation of general anesthesia, a central line was placed by anesthesia staff, and he was carefully secured to the operating room table with a safety strap in place. All potential pressure points were padded with egg crate. His abdomen was prepped and draped in usual sterile fashion.   Final timeout was performed, and it was confirmed he had received intravenous antibiotics. A midline incision was made, approximately 14 cm in length. The dissection was carried through the subcutaneous fatty tissue, down to the linea alba, which was incised in the midline, allowing atraumatic entry into the abdominal space. No ascites or other fluid was present on entry into the abdominal space. The omentum was retracted cephalad, exposing the small bowel. The small bowel was extracorporealized, and the terminal ileum and cecum were identified. The bowel was run in a retrograde fashion to the ligament of Treitz. The bowel was of normal caliber and no signs of ischemia or other pathology were present. No signs of Meckel diverticulum were evident. The appendix was normal in appearance. The colon was followed from the cecum to the rectum and no palpable masses or signs of colonic ischemia were present. The liver and gallbladder were likewise normal in appearance. The bowel was returned to its anatomic position, and after confirming hemostasis and correct sponge count, the fascia was reapproximated in the midline with a running looped #1 PDS suture. Subcutaneous tissues were irrigated with sterile saline. Skin edges were reapproximated with skin staples. The patient tolerated the procedure well. He was extubated in the operating room and transferred to the recovery area with plans for ongoing resuscitation and transfer to the intensive care unit.   The attending surgeon, Dr. Ishaan Liz, was scrubbed and present for the entire procedure      Cortney Simeon MD       [facility] / Cece Ahumada  D: 12/19/2017 04:50     T: 12/19/2017 10:01  JOB #: [id number]

## 2017-12-19 NOTE — ROUTINE PROCESS
TRANSFER - OUT REPORT:    Verbal report given to Candace Briceno RN(name) on [facility]  being transferred to OR(unit) for ordered procedure       Report consisted of patients Situation, Background, Assessment and   Recommendations(SBAR). Information from the following report(s) SBAR was reviewed with the receiving nurse. Lines:   Peripheral IV 12/18/17 Left Antecubital (Active)   Site Assessment Clean, dry, & intact 12/18/2017  7:18 PM   Phlebitis Assessment 0 12/18/2017  7:18 PM   Infiltration Assessment 0 12/18/2017  7:18 PM   Dressing Status Clean, dry, & intact 12/18/2017  7:18 PM   Dressing Type Transparent 12/18/2017  7:18 PM       Peripheral IV 12/18/17 Right Wrist (Active)   Site Assessment Clean, dry, & intact 12/18/2017  7:19 PM   Phlebitis Assessment 0 12/18/2017  7:19 PM   Infiltration Assessment 0 12/18/2017  7:19 PM   Dressing Status Clean, dry, & intact 12/18/2017  7:19 PM   Dressing Type Transparent 12/18/2017  7:19 PM       Peripheral IV 12/18/17 Right Antecubital (Active)   Site Assessment Clean, dry, & intact 12/18/2017  7:19 PM   Phlebitis Assessment 0 12/18/2017  7:19 PM   Infiltration Assessment 0 12/18/2017  7:19 PM   Dressing Status Clean, dry, & intact 12/18/2017  7:19 PM   Dressing Type Transparent 12/18/2017  7:19 PM        Opportunity for questions and clarification was provided.       Patient transported with:   Monitor   RN

## 2017-12-19 NOTE — H&P
H&P [facility]. Job# [id number].     Diagnosis   Acute renal failure (N17.9)   Shock (R57.9)   Ischemic bowel disease (K55.9)   Lactic acidosis (E87.2)   Symptomatic bradycardia (R00.1)   Syncope (R55)   Abdominal pain, generalized (R10.84)   Alcohol abuse (F10.10)

## 2017-12-19 NOTE — PROGRESS NOTES
Day #1 of Zosyn  Indication:  intra-abdominal infection  Current regimen:  3.375G Now  Abx regimen: Zosyn  Recent Labs      17   WBC  9.8   CREA  5.01*   BUN  45*     Est CrCl: 9 ml/min; UO: n/a ml/kg/hr  Temp (24hrs), Av.1 °F (36.2 °C), Min:96.2 °F (35.7 °C), Max:97.9 °F (36.6 °C)    Cultures:  blood & urine - pending    Plan: Change to 3.375G + 6.75G continuous infusion

## 2017-12-19 NOTE — ANESTHESIA PREPROCEDURE EVALUATION
Anesthetic History               Review of Systems / Medical History      Pulmonary                   Neuro/Psych              Cardiovascular    Hypertension                   GI/Hepatic/Renal               Comments: Etoh abuse Endo/Other             Other Findings              Physical Exam    Airway  Mallampati: II  TM Distance: > 6 cm  Neck ROM: normal range of motion   Mouth opening: Normal     Cardiovascular  Regular rate and rhythm,  S1 and S2 normal,  no murmur, click, rub, or gallop             Dental    Dentition: Poor dentition     Pulmonary  Breath sounds clear to auscultation               Abdominal  GI exam deferred       Other Findings            Anesthetic Plan    ASA: 3, emergent  Anesthesia type: general    Monitoring Plan: CVP      Induction: Intravenous  Anesthetic plan and risks discussed with: Patient

## 2017-12-19 NOTE — ANESTHESIA POSTPROCEDURE EVALUATION
Post-Anesthesia Evaluation and Assessment    Patient: Jose Juan Alonso MRN: [id number]  SSN: [id number]    YOB: 1955  Age: 65 y.o. Sex: male       Cardiovascular Function/Vital Signs  Visit Vitals    /87    Pulse 65    Temp 36.4 °C (97.6 °F)    Resp 14    Ht 5' 2\" (1.575 m)    Wt 41 kg (90 lb 6.2 oz)    SpO2 100%    BMI 16.53 kg/m2       Patient is status post general anesthesia for Procedure(s):  LAPAROTOMY EXPLORATORY. Nausea/Vomiting: None    Postoperative hydration reviewed and adequate. Pain:  Pain Scale 1: Numeric (0 - 10) (12/19/17 0133)  Pain Intensity 1: 4 (12/19/17 0133)   Managed    Neurological Status:   Neuro (WDL): Within Defined Limits (12/19/17 0050)   At baseline    Mental Status and Level of Consciousness: Arousable    Pulmonary Status:   O2 Device: Nasal cannula (12/19/17 0049)   Adequate oxygenation and airway patent    Complications related to anesthesia: None    Post-anesthesia assessment completed.  No concerns    Signed By: Aniya Brooks MD     December 19, 2017

## 2017-12-19 NOTE — CONSULTS
[facility]   [address] [facility], [address] [place] [address], [facility]  Phone: [phone] NOTE     Patient: Alfredo Saul MRN: [id number]  PCP: Myla   :     1955  Age:   65 y.o. Sex:  male      Referring physician: Manoj Hamilton MD  Reason for consultation: 65 y.o. male with Shock ([address]  ARF (acute renal failure) ([address]  Symptomatic bradycardia  Lactic acidosis  Syncope  UNKNOWN complicated by ARCADIO   Admission Date: 2017  7:04 PM  LOS: 1 day      ASSESSMENT and PLAN :   ARCADIO on CKD   - 2/2 Renal hemodynamic instability from Hypotension,. Bradycardia and ACE (I)  - resolving w/ IVF   - continue to hold Lisinopril/ HCTZ    CKD Stage III  - baseline Cr 1.3 mg/dl in 2017   - presumed 2/2 HTN     Metabolic Acidosis :  - Non gap acidosis 2/2 ARF  - improving     Acute Abdominal Pain   - Ex lap normal     Chronic alcoholism     Hypotension : resolved w/ IVF     [place] cardia : per cards       Thank you for the consult      Subjective:   HPI: Alfredo Saul is a 65 y.o.  male with underlying mild CKD who has been admitted to the hospital for syncopal episode   He was found to have profoundly hypotension w/ Bardycardia,ARF and w/ metabolic acidosis. He underwent emergent Ex lap for possible ischemic gut and lap was negative. His renal failure improved overnight once his hemodynamics were better  He has been non oliguric and Cr down to 3 mg/dl today   He denied any NSAID use PTA  Chronic alcoholic   Uses marijuana and denied other substance abuse     Past Medical Hx:   Past Medical History:   Diagnosis Date    Gout     Hypertension         Past Surgical Hx:   History reviewed. No pertinent surgical history. No Known Allergies    Social Hx:  reports that he has never smoked. He has never used smokeless tobacco. He reports that he drinks alcohol. He reports that he does not use illicit drugs. History reviewed.  No pertinent family history. Review of Systems:  A twelve point review of system was performed today. Pertinent positives and negatives are mentioned in the HPI. The reminder of the ROS is negative and noncontributory. Objective:    Vitals:    Vitals:    12/19/17 0830 12/19/17 0900 12/19/17 1000 12/19/17 1100   BP:  134/83 129/78 145/84   Pulse: 61 60 (!) 58 (!) 56   Resp: 15 17 16 17   Temp:       SpO2: 96% 96% 96% 95%   Weight:       Height:         I&O's:  12/18 0701 - 12/19 0700  In: 1803 [I.V.:1803]  Out: 1125 [Urine:1100]  Visit Vitals    /84    Pulse (!) 56    Temp 97.9 °F (36.6 °C)    Resp 17    Ht 5' 2\" (1.575 m)    Wt 41 kg (90 lb 6.2 oz)    SpO2 95%    BMI 16.53 kg/m2       Physical Exam:  General:Alert, No distress,   HEENT: Eyes are PERRL. Conjunctiva without pallor ,erythema. The sclerae without icterus. .   Neck:Supple,no mass palpable  Lungs : Clears to auscultation Bilaterally, Normal respiratory effort  CVS: RRR, S1 S2 normal, No rub, no LE edema  Abdomen: midline incision, soft, non tender   Extremities: No cyanosis, No clubbing  Skin: No rash or lesions.   Lymph nodes: No palpable nodes  MS: No joint swelling, erythema, warmth  Neurologic: non focal, AAO x 3  Psych: normal affect    Laboratory Results:    Recent Labs      12/19/17   0443  12/18/17 1917   NA  140  130*   K  4.7  4.3   CL  112*  97   CO2  16*  19*   GLU  125*  219*   BUN  38*  45*   CREA  3.00*  5.01*   CA  7.2*  9.1   MG  1.5*  1.8   PHOS  4.8*  7.3*   ALB  2.9*  4.1   SGOT  28  32   ALT  16  24     Recent Labs      12/19/17   0443  12/18/17 1917   WBC  7.6  9.8   HGB  10.1*  13.1   HCT  29.6*  38.5   PLT  116*  181     No results found for: SDES  Lab Results   Component Value Date/Time    Culture result: NO GROWTH AFTER 9 HOURS 12/18/2017 07:24 PM     Recent Results (from the past 24 hour(s))   EKG, 12 LEAD, INITIAL    Collection Time: 12/18/17  7:13 PM   Result Value Ref Range    Ventricular Rate 96 BPM    Atrial Rate 96 BPM    P-R Interval 144 ms    QRS Duration 66 ms    Q-T Interval 352 ms    QTC Calculation (Bezet) 444 ms    Calculated P Axis 71 degrees    Calculated R Axis 67 degrees    Calculated T Axis 45 degrees    Diagnosis       Normal sinus rhythm  When compared with ECG of 21-JUL-2017 10:31,  Vent. rate has increased BY  35 BPM  QT has lengthened     CBC W/O DIFF    Collection Time: 12/18/17  7:17 PM   Result Value Ref Range    WBC 9.8 4.1 - 11.1 K/uL    RBC 4.21 4.10 - 5.70 M/uL    HGB 13.1 12.1 - 17.0 g/dL    HCT 38.5 36.6 - 50.3 %    MCV 91.4 80.0 - 99.0 FL    MCH 31.1 26.0 - 34.0 PG    MCHC 34.0 30.0 - 36.5 g/dL    RDW 14.1 11.5 - 14.5 %    PLATELET 769 734 - 821 K/uL   METABOLIC PANEL, COMPREHENSIVE    Collection Time: 12/18/17  7:17 PM   Result Value Ref Range    Sodium 130 (L) 136 - 145 mmol/L    Potassium 4.3 3.5 - 5.1 mmol/L    Chloride 97 97 - 108 mmol/L    CO2 19 (L) 21 - 32 mmol/L    Anion gap 14 5 - 15 mmol/L    Glucose 219 (H) 65 - 100 mg/dL    BUN 45 (H) 6 - 20 MG/DL    Creatinine 5.01 (H) 0.70 - 1.30 MG/DL    BUN/Creatinine ratio 9 (L) 12 - 20      GFR est AA 14 (L) >60 ml/min/1.73m2    GFR est non-AA 12 (L) >60 ml/min/1.73m2    Calcium 9.1 8.5 - 10.1 MG/DL    Bilirubin, total 0.3 0.2 - 1.0 MG/DL    ALT (SGPT) 24 12 - 78 U/L    AST (SGOT) 32 15 - 37 U/L    Alk.  phosphatase 87 45 - 117 U/L    Protein, total 8.2 6.4 - 8.2 g/dL    Albumin 4.1 3.5 - 5.0 g/dL    Globulin 4.1 (H) 2.0 - 4.0 g/dL    A-G Ratio 1.0 (L) 1.1 - 2.2     MAGNESIUM    Collection Time: 12/18/17  7:17 PM   Result Value Ref Range    Magnesium 1.8 1.6 - 2.4 mg/dL   PHOSPHORUS    Collection Time: 12/18/17  7:17 PM   Result Value Ref Range    Phosphorus 7.3 (H) 2.6 - 4.7 MG/DL   TROPONIN I    Collection Time: 12/18/17  7:17 PM   Result Value Ref Range    Troponin-I, Qt. <0.04 <0.05 ng/mL   CK W/ CKMB & INDEX    Collection Time: 12/18/17  7:17 PM   Result Value Ref Range     (H) 39 - 308 U/L    CK - MB <1.0 <3.6 NG/ML    CK-MB Index Cannot be calculated 0 - 2.5     LACTIC ACID    Collection Time: 12/18/17  7:24 PM   Result Value Ref Range    Lactic acid 3.5 (HH) 0.4 - 2.0 MMOL/L   CULTURE, BLOOD, PAIRED    Collection Time: 12/18/17  7:24 PM   Result Value Ref Range    Special Requests: NO SPECIAL REQUESTS      Culture result: NO GROWTH AFTER 9 HOURS     URINALYSIS W/MICROSCOPIC    Collection Time: 12/18/17  8:31 PM   Result Value Ref Range    Color YELLOW/STRAW      Appearance CLOUDY (A) CLEAR      Specific gravity 1.012 1.003 - 1.030      pH (UA) 5.5 5.0 - 8.0      Protein 100 (A) NEG mg/dL    Glucose 100 (A) NEG mg/dL    Ketone NEGATIVE  NEG mg/dL    Bilirubin NEGATIVE  NEG      Blood LARGE (A) NEG      Urobilinogen 0.2 0.2 - 1.0 EU/dL    Nitrites NEGATIVE  NEG      Leukocyte Esterase NEGATIVE  NEG      WBC 0-4 0 - 4 /hpf    RBC 0-5 0 - 5 /hpf    Epithelial cells MODERATE (A) FEW /lpf    Bacteria 1+ (A) NEG /hpf    Mucus 4+ (A) NEG /lpf   URINE CULTURE HOLD SAMPLE    Collection Time: 12/18/17  8:31 PM   Result Value Ref Range    Urine culture hold URINE ON HOLD IN MICROBIOLOGY DEPT FOR 3 DAYS     POC G3 - PUL    Collection Time: 12/18/17 10:23 PM   Result Value Ref Range    pH (POC) 7.416 7.35 - 7.45      pCO2 (POC) 20.8 (L) 35.0 - 45.0 MMHG    pO2 (POC) 102 (H) 80 - 100 MMHG    HCO3 (POC) 13.4 (L) 22 - 26 MMOL/L    sO2 (POC) 98 (H) 92 - 97 %    Base deficit (POC) 11 mmol/L    Site LEFT BRACHIAL      Device: ROOM AIR      Allens test (POC) YES      Specimen type (POC) ARTERIAL     LACTIC ACID    Collection Time: 12/18/17 10:36 PM   Result Value Ref Range    Lactic acid 1.5 0.4 - 2.0 MMOL/L   METABOLIC PANEL, COMPREHENSIVE    Collection Time: 12/19/17  4:43 AM   Result Value Ref Range    Sodium 140 136 - 145 mmol/L    Potassium 4.7 3.5 - 5.1 mmol/L    Chloride 112 (H) 97 - 108 mmol/L    CO2 16 (L) 21 - 32 mmol/L    Anion gap 12 5 - 15 mmol/L    Glucose 125 (H) 65 - 100 mg/dL    BUN 38 (H) 6 - 20 MG/DL    Creatinine 3.00 (H) 0.70 - 1.30 MG/DL    BUN/Creatinine ratio 13 12 - 20      GFR est AA 26 (L) >60 ml/min/1.73m2    GFR est non-AA 21 (L) >60 ml/min/1.73m2    Calcium 7.2 (L) 8.5 - 10.1 MG/DL    Bilirubin, total 0.2 0.2 - 1.0 MG/DL    ALT (SGPT) 16 12 - 78 U/L    AST (SGOT) 28 15 - 37 U/L    Alk. phosphatase 53 45 - 117 U/L    Protein, total 5.9 (L) 6.4 - 8.2 g/dL    Albumin 2.9 (L) 3.5 - 5.0 g/dL    Globulin 3.0 2.0 - 4.0 g/dL    A-G Ratio 1.0 (L) 1.1 - 2.2     PHOSPHORUS    Collection Time: 12/19/17  4:43 AM   Result Value Ref Range    Phosphorus 4.8 (H) 2.6 - 4.7 MG/DL   MAGNESIUM    Collection Time: 12/19/17  4:43 AM   Result Value Ref Range    Magnesium 1.5 (L) 1.6 - 2.4 mg/dL   CBC WITH AUTOMATED DIFF    Collection Time: 12/19/17  4:43 AM   Result Value Ref Range    WBC 7.6 4.1 - 11.1 K/uL    RBC 3.29 (L) 4.10 - 5.70 M/uL    HGB 10.1 (L) 12.1 - 17.0 g/dL    HCT 29.6 (L) 36.6 - 50.3 %    MCV 90.0 80.0 - 99.0 FL    MCH 30.7 26.0 - 34.0 PG    MCHC 34.1 30.0 - 36.5 g/dL    RDW 13.9 11.5 - 14.5 %    PLATELET 985 (L) 978 - 400 K/uL    NEUTROPHILS 92 (H) 32 - 75 %    LYMPHOCYTES 5 (L) 12 - 49 %    MONOCYTES 3 (L) 5 - 13 %    EOSINOPHILS 0 0 - 7 %    BASOPHILS 0 0 - 1 %    ABS. NEUTROPHILS 7.0 1.8 - 8.0 K/UL    ABS. LYMPHOCYTES 0.4 (L) 0.8 - 3.5 K/UL    ABS. MONOCYTES 0.2 0.0 - 1.0 K/UL    ABS. EOSINOPHILS 0.0 0.0 - 0.4 K/UL    ABS.  BASOPHILS 0.0 0.0 - 0.1 K/UL    DF SMEAR SCANNED      RBC COMMENTS NORMOCYTIC, NORMOCHROMIC             Urine dipstick:   Lab Results   Component Value Date/Time    Color YELLOW/STRAW 12/18/2017 08:31 PM    Appearance CLOUDY 12/18/2017 08:31 PM    Specific gravity 1.012 12/18/2017 08:31 PM    pH (UA) 5.5 12/18/2017 08:31 PM    Protein 100 12/18/2017 08:31 PM    Glucose 100 12/18/2017 08:31 PM    Ketone NEGATIVE  12/18/2017 08:31 PM    Bilirubin NEGATIVE  12/18/2017 08:31 PM    Urobilinogen 0.2 12/18/2017 08:31 PM    Nitrites NEGATIVE  12/18/2017 08:31 PM    Leukocyte Esterase NEGATIVE  12/18/2017 08:31 PM    Epithelial cells MODERATE 12/18/2017 08:31 PM    Bacteria 1+ 12/18/2017 08:31 PM    WBC 0-4 12/18/2017 08:31 PM    RBC 0-5 12/18/2017 08:31 PM       I have reviewed the following: A   Medications list Personally Reviewed   [x]      Yes     []               No       Medications:  Prior to Admission medications    Medication Sig Start Date End Date Taking? Authorizing Provider   allopurinol (ZYLOPRIM) 100 mg tablet Take 100 mg by mouth daily. Socrates Diego MD   acetaminophen (TYLENOL) 500 mg tablet Take 500 mg by mouth every six (6) hours as needed for Pain. Socrates Diego MD   lisinopril-hydroCHLOROthiazide (PRINZIDE, ZESTORETIC) 20-12.5 mg per tablet Take  by mouth daily. Socrates Diego MD        Thank you for allowing us to participate in the care of this patient. We will follow patient. Please dont hesitate to call with any questions    Elliot Ramos MD  [place] [facility]   [address] [facility] [address]  Phone - [phone]   Fax - [phone]  [email or web address]

## 2017-12-19 NOTE — H&P
[facility]  ACUTE CARE HISTORY AND PHYSICAL    Jose L HANNA  MR#: [id number]  : 1955  ACCOUNT #: [de-identified]   DATE OF SERVICE: 2017    CHIEF COMPLAINT:  Abdominal pain. HISTORY OF PRESENT ILLNESS:  A 58-year-old -American male with past medical history of gout and hypertension presented to the Emergency Department from home accompanied by a sister and brother with chief complaint of abdominal pain. Symptoms onset reportedly began two days ago. The patient is a limited historian. His sister and brother (later arrived) provided some additional history. The remainder of his limited history was obtained from review of ED medical reports. Per these collected reports, the patient's abdominal pain was described as 10/10, moderate to severe without specific exacerbating or alleviating factors, constant. The patient reportedly had diarrhea, only actually one episode (later reported) without any evidence of melena. There were no reports of hematochezia. No reports of fever or chills. He reportedly complained of having one episode of nausea, vomiting, nonbloody, nonbilious emesis. Today in the afternoon, the patient reportedly had a syncopal episode while at home, which was not witnessed by his sister who was present in the home. The patient was subsequently brought to the Emergency Department for further evaluation. Per review of chart records, he had last been in the Emergency Department on 2017 with complaints of weakness and had been diagnosed with noted chronic kidney disease. His brother notes that the patient followed up with his PCP, has not been seen by a nephrologist.  He reportedly was instructed to drink fluids; however, the patient then stated sometimes drinks 40 ounces of beer instead. His sister notes that the patient has had decreased appetite and progressive weight loss since the summertime.   At highest, the patient's weight was approximately 105-110 pounds. They were unable to state the exact amount of his recent weight loss. When in our emergency department, his reported vital signs include blood pressure of 53/34, heart rate of 36, respiration rate of 14, saturation 97% on room air. There was a transient decrease in O2 saturation, 92% recorded per the ED. However, the patient does not complain of any shortness of breath. Labs reveal elevated BUN of 45, a creatinine of 5.01, GFR 14 (compared to last creatinine 1.37 on 07/21/2017), lactic acid 3.5, sodium 130, blood glucose of 219. Serum CO2 of 19. Potassium equals 4.3 (within normal limits). The patient was reportedly given a dose of atropine in the ED. Subsequently, a 12-lead EKG showed normal sinus rhythm, 96 beats per minute. As the patient's temperature was on the lower range, the ED placed the patient on a Sloane Hugger external warming device. His temperature was 96.2 degrees Fahrenheit. The patient is now seen for admission to the hospice service. Continue evaluation and treatments. Per chart review, the patient was given 0.9% normal saline plus additional 500 mL of IV fluid bolus. A Ellington catheter was placed and the patient has had minimal urine output. There were no reports of headache, head or neck trauma, visual disturbance, neck pain, back pain, chest pain, shortness of breath, cough, congestion, fever, chills, rash or other constitutional symptoms. PAST MEDICAL HISTORY:  Gout, hypertension. PAST SURGICAL HISTORY:  None. MEDICATIONS  1. Tylenol 500 mg p.o. q.6h. p.r.n.  2. Allopurinol 100 mg p.o. every day. 3. Lisinopril/hydrochlorothiazide 20/12.5 mg 1 tablet p.o. daily. ALLERGIES:  NO KNOWN DRUG ALLERGIES. SOCIAL HISTORY:  No reports of smoking. Positive drinking at 40 ounces of beer occasionally. Denies daily consumption. Denies illegal drugs. FAMILY HISTORY:  CHF mother, TIA mother, diabetes mellitus in mother, hypertension in mother. Myocardial infarction father, . REVIEW OF SYSTEMS:  All systems reviewed. Pertinent positives were as in HPI, otherwise negative. PHYSICAL EXAMINATION  VITAL SIGNS:  Temperature current 97.9 degrees Fahrenheit, blood pressure 107/61, heart rate 85, respiration rate of 18, O2 saturation 100% on room air, recorded weight of 90 pounds (41 kg), recorded height of 5 feet 2 inches tall. GENERAL:  Underweight male in no acute respiratory distress. PSYCHIATRIC:  Patient awake, alert x3, anxious. NEUROLOGIC:  GCS of 15. Moves extremities x4. Sensation grossly intact without sensory facial droop. HEENT:  Normocephalic, atraumatic. PERRLA. EOMs intact. Sclerae are anicteric. Conjunctivae clear. Nares are patent. Oropharynx is clear. Tongue is midline, not edematous. Oral mucosa dry. Poor oral dentition. NECK:  Supple, without lymphadenopathy, JVD, carotid bruits. No thyromegaly. LYMPH:  Negative cervical or supraclavicular adenopathy. RESPIRATORY:  Lungs clear to auscultation bilaterally. CARDIOVASCULAR:  Regular rate and rhythm, normal S1, S2, without murmurs, rubs or gallops. GASTROINTESTINAL: Abdomen is soft, nontender, nondistended, normoactive bowel sounds. No rebound, guarding, rigidity. No auscultated bruits or pulsatile mass. GENITOURINARY:  Ellington catheter is in place. BACK:  No CVA tenderness. No step-off. MUSCULOSKELETAL:  No acute palpable deformity. Negative for calf tenderness. VASCULAR:  2+ radial, 1+ dorsalis pedis pulses without cyanosis, clubbing, edema. SKIN:  Warm and dry. LABORATORY DATA:  Reviewed as follows:  Sodium is 130, potassium 4.3, chloride 97, CO2 19, BUN 45, creatinine 5.01, glucose 219, anion gap of 14, calcium is 9.1, phos of 7.3. Magnesium is 1.8, GFR 14, total bilirubin 0.3, total protein 8.2, albumin 4.1, ALT 24, AST 32, alkaline phosphatase 87, lactic acid 3.5, CK total 370, CK-MB of less than 1.0, troponin I less than 0.04.   WBC 9.8, hemoglobin 13.1, hematocrit 38.5, platelets 040. Urinalysis:  Leukocyte esterase negative, nitrites negative, urobilinogen 0.2, bilirubin negative, blood large, ketones negative, glucose 100, protein 100, pH of 5.5, specific gravity 1.012, WBCs 0-4, RBCs 0-5, bacteria 1+. CT abdomen and pelvis without contrast shows thickened small bowel loops with pneumatosis intestinalis. Appearance suggests possible ischemic bowel. Chest x-ray portable, no acute abnormality. Retroperitoneal ultrasound shows normal renal ultrasound examination. A 12 lead EKG is normal sinus rhythm at 96 beats per minute. ASSESSMENT AND PLAN  1. Shock/hypotension  -- likely secondary to hypovolemia. At this time, patient will be admitted to ICU. Blood pressure has improved. We will continue with IV fluid resuscitation. Concern now for new CT findings showing possible ischemic bowel with pneumatosis intestinalis. Treat underlying factors. 2.  Acute renal failure. Order 0.9% normal saline 1000 mL with fluid bolus followed by 125 mL IV maintenance fluids. Continue with strict I's and O's, daily weights, monitor urine output closely. Consult with nephrologist.  3.  Pneumatosis intestinalis -- possible ischemic bowel. Results were just received. Will consult with general surgery for evaluation. Continue with volume resuscitation hydration. 4.  Lactic acidosis. Continue with IV fluids until lactic acidosis is corrected. 5.  Generalized abdominal pain. No abdominal tenderness on examination. However, the patient may have some cognitive delay. Given CT findings as mentioned, will consult with general surgery for further recommendations and treatment. 6.  Syncope. Patient currently is on bed rest.  Obtain orthostatic vital signs. May be secondary to noted bradycardia or hypovolemia shock. Will order a 2D echocardiogram, place on telemetry monitoring. 7.  Symptomatic bradycardia. Plan as noted above.  Cardiologist was consulted per the ED. 8.  Diarrhea -- has been reported. Order stool for Clostridium difficile, stool occult blood test, ova and parasite, and fecal WBC. 9.  Type 2 diabetes mellitus. Patient has random blood glucose greater than 200. Has not had polyuria, polydipsia, polyphagia. We will assess patient's status. Check hemoglobin A1c level. Check point of care glucose level and place on Humalog insulin correction coverage. 10.  Metabolic acidosis. Continue workup and plan as noted above. 11.  Alcohol abuse. Placed on [facility] alcohol withdrawal protocol, Ativan p.r.n. Order banana bag therapy, multivitamin, thiamine, folate daily therapy. Advised the patient of alcohol reduction or cessation. 12.  Generalized weakness and instability. Place on fall precautions. 13.  Venous thromboembolism prophylaxis. SCDs to bilateral lower extremities. Discussed plan of care with the patient's sister and brother. 14.  Cachexia -- underweight. Plan as noted above. Also, with renal failure lab protocols, also spoke with the patient about checking for HIV and hepatitis, and he consented for the same. The patient, also, noted it was acceptable and agreeable with him to continue with examination and discussions with his sister and brother present. MD RAY Sadns / Candace Bello  D: 12/18/2017 22:05     T: 12/19/2017 07:01  JOB #: [id number]

## 2017-12-19 NOTE — BRIEF OP NOTE
BRIEF OPERATIVE NOTE    Date of Procedure: 12/18/2017   Preoperative Diagnosis: ACIDOSIS WITH ABDOMINAL PAIN  Postoperative Diagnosis: ACIDOSIS WITH ABDOMINAL PAIN    Procedure(s):  LAPAROTOMY EXPLORATORY  Surgeon(s) and Role:     * He Lay MD - Primary         Assistant Staff:       Surgical Staff:  Circ-1: Kori Coley RN  Scrub Tech-1: Rc Friday  Surg Asst-1: Vivian Bolivar  Event Time In   Incision Start 2341   Incision Close 0011     Anesthesia: General   Estimated Blood Loss: minimal  Specimens: * No specimens in log *   Findings:  no ischemic bowel  Complications: none  Implants: * No implants in log *

## 2017-12-19 NOTE — CONSULTS
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 His family notes patient complaining of vague sense of malaise over the weekend with decreased oral intake. They administered OTC medications without improvement. He experienced syncope x 2 earlier today and was transported to [facility]. He was found to be profoundly hypotensive and bradycardic with complaint of abdominal pain (vague, diffuse, crampy, 3/10, worse with movement). Associated symptoms include diarrhea; he denies nausea, vomiting, fever, chills, blood per rectum, chest pain; no prior colonoscopy. Patient Active Problem List    Diagnosis Date Noted    Lactic acidosis 12/18/2017    Shock ([facility] [address] 12/18/2017    Syncope 12/18/2017    ARF (acute renal failure) ([facility] [address] 12/18/2017    Symptomatic bradycardia 12/18/2017    Ischemic bowel disease ([facility] [address] 12/18/2017    Pneumatosis intestinalis 12/18/2017     Past Medical History:   Diagnosis Date    Gout     Hypertension       History reviewed. No pertinent surgical history. Social History   Substance Use Topics    Smoking status: Never Smoker    Smokeless tobacco: Never Used    Alcohol use Yes      Comment: Everyday      History reviewed. No pertinent family history. Current Facility-Administered Medications   Medication Dose Route Frequency    0.9% sodium chloride infusion  125 mL/hr IntraVENous CONTINUOUS    sodium chloride (NS) flush 5-10 mL  5-10 mL IntraVENous Q8H    sodium chloride (NS) flush 5-10 mL  5-10 mL IntraVENous PRN    0.9% sodium chloride 1,000 mL with mvi, adult no. 4 with vit K 10 mL, thiamine 953 mg, folic acid 1 mg infusion   IntraVENous Q24H    LORazepam (ATIVAN) injection 1 mg  1 mg IntraVENous Q4H PRN    LORazepam (ATIVAN) injection 2 mg  2 mg IntraVENous Q6H PRN    piperacillin-tazobactam (ZOSYN) 3.375 g in  ml premix/cmpd  3.375 g IntraVENous NOW    And    piperacillin-tazobactam (ZOSYN) 6.75 g in 0.9% sodium chloride 500 mL infusion  6.75 g IntraVENous Q24H     Current Outpatient Prescriptions   Medication Sig    allopurinol (ZYLOPRIM) 100 mg tablet Take 100 mg by mouth daily.  acetaminophen (TYLENOL) 500 mg tablet Take 500 mg by mouth every six (6) hours as needed for Pain.  lisinopril-hydroCHLOROthiazide (PRINZIDE, ZESTORETIC) 20-12.5 mg per tablet Take  by mouth daily. No Known Allergies    Review of Systems:    A complete review of systems was negative except as noted in the HPI. Objective:        Visit Vitals    /64 (BP Patient Position: At rest)    Pulse 82    Temp 97.9 °F (36.6 °C)    Resp 20    Ht 5' 2\" (1.575 m)    Wt 90 lb 6.2 oz (41 kg)    SpO2 99%    BMI 16.53 kg/m2       Physical Exam:  GENERAL: alert, fatigued, cooperative, mild distress, appears older than stated age, EYE: negative, LYMPH NODES: Cervical, supraclavicular nodes normal. THROAT & NECK: dry mucus membranes, poor dentition. LUNG: clear to auscultation bilaterally, HEART: Bradycardia, regular rhythm. No murmur. ABDOMEN: hypoactive bowel sounds, soft, non-distended. Mild diffuse pain with palpation, borderline sense of rigidity; no mass. EXTREMITIES:  extremities normal, atraumatic, no cyanosis or edema, SKIN: Normal., NEUROLOGIC: negative    Imaging:  reviewed  CT    Lab/Data Review:  Recent Results (from the past 12 hour(s))   EKG, 12 LEAD, INITIAL    Collection Time: 12/18/17  7:13 PM   Result Value Ref Range    Ventricular Rate 96 BPM    Atrial Rate 96 BPM    P-R Interval 144 ms    QRS Duration 66 ms    Q-T Interval 352 ms    QTC Calculation (Bezet) 444 ms    Calculated P Axis 71 degrees    Calculated R Axis 67 degrees    Calculated T Axis 45 degrees    Diagnosis       Normal sinus rhythm  When compared with ECG of 21-JUL-2017 10:31,  Vent.  rate has increased BY  35 BPM  QT has lengthened     CBC W/O DIFF    Collection Time: 12/18/17  7:17 PM   Result Value Ref Range    WBC 9.8 4.1 - 11.1 K/uL    RBC 4.21 4.10 - 5.70 M/uL    HGB 13.1 12.1 - 17.0 g/dL    HCT 38.5 36.6 - 50.3 %    MCV 91.4 80.0 - 99.0 FL    MCH 31.1 26.0 - 34.0 PG    MCHC 34.0 30.0 - 36.5 g/dL    RDW 14.1 11.5 - 14.5 %    PLATELET 174 579 - 395 K/uL   METABOLIC PANEL, COMPREHENSIVE    Collection Time: 12/18/17  7:17 PM   Result Value Ref Range    Sodium 130 (L) 136 - 145 mmol/L    Potassium 4.3 3.5 - 5.1 mmol/L    Chloride 97 97 - 108 mmol/L    CO2 19 (L) 21 - 32 mmol/L    Anion gap 14 5 - 15 mmol/L    Glucose 219 (H) 65 - 100 mg/dL    BUN 45 (H) 6 - 20 MG/DL    Creatinine 5.01 (H) 0.70 - 1.30 MG/DL    BUN/Creatinine ratio 9 (L) 12 - 20      GFR est AA 14 (L) >60 ml/min/1.73m2    GFR est non-AA 12 (L) >60 ml/min/1.73m2    Calcium 9.1 8.5 - 10.1 MG/DL    Bilirubin, total 0.3 0.2 - 1.0 MG/DL    ALT (SGPT) 24 12 - 78 U/L    AST (SGOT) 32 15 - 37 U/L    Alk.  phosphatase 87 45 - 117 U/L    Protein, total 8.2 6.4 - 8.2 g/dL    Albumin 4.1 3.5 - 5.0 g/dL    Globulin 4.1 (H) 2.0 - 4.0 g/dL    A-G Ratio 1.0 (L) 1.1 - 2.2     MAGNESIUM    Collection Time: 12/18/17  7:17 PM   Result Value Ref Range    Magnesium 1.8 1.6 - 2.4 mg/dL   PHOSPHORUS    Collection Time: 12/18/17  7:17 PM   Result Value Ref Range    Phosphorus 7.3 (H) 2.6 - 4.7 MG/DL   TROPONIN I    Collection Time: 12/18/17  7:17 PM   Result Value Ref Range    Troponin-I, Qt. <0.04 <0.05 ng/mL   CK W/ CKMB & INDEX    Collection Time: 12/18/17  7:17 PM   Result Value Ref Range     (H) 39 - 308 U/L    CK - MB <1.0 <3.6 NG/ML    CK-MB Index Cannot be calculated 0 - 2.5     LACTIC ACID    Collection Time: 12/18/17  7:24 PM   Result Value Ref Range    Lactic acid 3.5 (HH) 0.4 - 2.0 MMOL/L   URINALYSIS W/MICROSCOPIC    Collection Time: 12/18/17  8:31 PM   Result Value Ref Range    Color YELLOW/STRAW      Appearance CLOUDY (A) CLEAR      Specific gravity 1.012 1.003 - 1.030      pH (UA) 5.5 5.0 - 8.0      Protein 100 (A) NEG mg/dL    Glucose 100 (A) NEG mg/dL    Ketone NEGATIVE  NEG mg/dL    Bilirubin NEGATIVE  NEG      Blood LARGE (A) NEG      Urobilinogen 0.2 0.2 - 1.0 EU/dL    Nitrites NEGATIVE  NEG      Leukocyte Esterase NEGATIVE  NEG      WBC 0-4 0 - 4 /hpf    RBC 0-5 0 - 5 /hpf    Epithelial cells MODERATE (A) FEW /lpf    Bacteria 1+ (A) NEG /hpf    Mucus 4+ (A) NEG /lpf   URINE CULTURE HOLD SAMPLE    Collection Time: 12/18/17  8:31 PM   Result Value Ref Range    Urine culture hold URINE ON HOLD IN MICROBIOLOGY DEPT FOR 3 DAYS         CT Results  (Last 48 hours)               12/18/17 2105  CT ABD PELV WO CONT Final result    Impression:  IMPRESSION: Thickened small bowel loops with pneumatosis intestinalis. The   appearance suggests possible ischemic bowel. Narrative:  EXAM: CT ABDOMEN AND PELVIS WITHOUT CONTRAST   INDICATION:  Diarrhea with elevated lactate in acute renal failure. COMPARISON: None. CONTRAST: None. TECHNIQUE:    Unenhanced multislice helical CT was performed from the diaphragm to the   symphysis pubis without intravenous contrast administration. Oral contrast was   not administered. Contiguous 5 mm axial images were reconstructed and lung and   soft tissue windows were generated. Coronal and sagittal reformations were   generated. CT dose reduction was achieved through use of a standardized protocol   tailored for this examination and automatic exposure control for dose   modulation. Adaptive statistical iterative reconstruction (ASIR) was utilized   for this examination. FINDINGS:   LOWER CHEST: The visualized portions of the lung bases are clear. The absence of intravenous contrast material reduces the sensitivity for   evaluation of the solid parenchymal organs of the abdomen. ABDOMEN:   Liver: The liver is normal in size and contour with no focal abnormality. Gallbladder and bile ducts: There are no calcified stones and there is no   biliary duct dilatation. Spleen: No abnormality. Pancreas: No abnormality. Adrenal glands: No abnormality. Kidneys: No focal parenchymal abnormality. There is no renal or ureteral   calculus or obstruction. PELVIS:   Reproductive organs: The prostate gland and seminal vesicles are symmetrical.     Bladder: There is a Ellington catheter in the urinary bladder. The bladder wall is   mildly thickened and there is some air in the bladder. RETROPERITONEUM: The aorta tapers without aneurysm. There is no retroperitoneal   adenopathy or mass. There is no pelvic mass or adenopathy. BOWEL AND MESENTERY: There are multiple thickened small bowel loops with some   pneumatosis intestinalis. The small bowel is not obstructed. The appendix is not   identified. PERITONEUM: There is no ascites or free intraperitoneal air. BONES AND SOFT TISSUES: The bones and soft tissues of the abdominal wall are   within normal limits. Assessment:     Acidosis, shock, ARF; suspect acute mesenteric ischemia. Hypothermia, hypotension improved. Plan:     1. I recommend proceeding with emergent laparotomy. 2. Discussed aspects of surgical intervention, methods, risks (including by not limited to infection, bleeding, hematoma, need for resection, need for temporary stoma, need for second look laparotomy, risk of deterioration/death, and perforation of the intestines or solid organs), and the risks of general anesthetic. The patient understands the risks; all questions were answered to the patient's satisfaction. 3. Patient does wish to proceed with surgery.       Signed By: Kimberly Honeycutt MD     December 18, 2017

## 2017-12-19 NOTE — CDMP QUERY
Dear Dr. Jyotsna Delgado,    Noted diagnosis of ischemic bowel disease in the progress notes. Please include acuity (acute or chronic) of the disease in the progress notes, including discharge summary. =>Other Explanation of clinical findings  =>Unable to Determine (no explanation of clinical findings)    The medical record reflects the following clinical findings, treatment, and risk factors:    Risk Factors: 61yo adm with abd pain, poor appetite  Clinical Indicators: ct abd-Thickened small bowel loops with pneumatosis intestinalis. The appearance suggests possible ischemic bowel; per H&P \"ischemic bowel disease\"  Treatment: Surgical consult, IVF    Please clarify and document your clinical opinion in the progress notes and discharge summary including the definitive and/or presumptive diagnosis, (suspected or probable), related to the above clinical findings. Please include clinical findings supporting your diagnosis.     Thank You  Jeancarlos Sanchez,MSN,BSN,RN,[facility]  [phone]

## 2017-12-19 NOTE — PROGRESS NOTES
Hospitalist Progress Note  Elizabeth Landry MD  Answering service: [phone] OR [phone] from in house phone      Date of Service:  2017  NAME:  Ras Graves  :  1955  MRN:  [id number]      Admission Summary:    A 62-year-old -American male with past medical history of gout and hypertension presented to the Emergency Department from home accompanied by a sister and brother with chief complaint of abdominal pain. Interval history / Subjective:     Patient seen in followup up of abdominal pain,he is denying any pains.       Assessment & Plan:     S/P shock from hypovolemia  Improved with IVF    Abnormal CT scan on admission with possible acute ischemic bowel (on ct scan on admission)  S/p ex lap - unremarkable with normal bowel - possible recovery of ischemic bowel   Continue following for resolution of bowel function  Surgery following    Diarrhea on admission  R/O sepsis   Follow up cultures    Lactic acidosis  From shock- reolved    ARCADIO on admission with metabolic acidosis  Possible prerenal in nature versus ATN from Hypotension   Start on bicarb drip and monitor renal function   Improving with hydration  Renal dose medications  Hold ACE/HCTZ  Renal US negative    Bradycardia  Likely vasovagal  Resolved  Followup Echo    Etoh Abuse  On [facility] protocol    Hyperglycemia  Check A1c  Check Finsgersticks    Cachexia -- underweight on admission  Follow up HIV,Hepatitis testing     - had ex lap for diagnosis of ischemic bowel   Code status: Full  DVT prophylaxis: SCD    Care Plan discussed with: Patient/Family, Nurse and   Disposition: TBD     Hospital Problems  Date Reviewed: 2017          Codes Class Noted POA    Lactic acidosis ICD-10-CM: E87.2  ICD-9-CM: 276.2  2017 Yes        * (Principal)Shock ([address] ICD-10-CM: R57.9  ICD-9-CM: 785.50  2017 Yes        Syncope ICD-10-CM: R55  ICD-9-CM: 780.2 12/18/2017 Yes        ARF (acute renal failure) (HCC) ICD-10-CM: N17.9  ICD-9-CM: 584.9  12/18/2017 Yes        Symptomatic bradycardia ICD-10-CM: R00.1  ICD-9-CM: 427.89  12/18/2017 Yes        Pneumatosis intestinalis ICD-10-CM: L85.51  ICD-9-CM: 569.89  12/18/2017 Yes                Review of Systems:   A comprehensive review of systems was negative except for that written in the HPI. Vital Signs:    Last 24hrs VS reviewed since prior progress note. Most recent are:  Visit Vitals    BP (!) 157/94 (BP 1 Location: Left arm, BP Patient Position: Sitting)    Pulse 63    Temp 97.6 °F (36.4 °C)    Resp 18    Ht 5' 2\" (1.575 m)    Wt 41 kg (90 lb 6.2 oz)    SpO2 97%    BMI 16.53 kg/m2         Intake/Output Summary (Last 24 hours) at 12/19/17 1421  Last data filed at 12/19/17 1322   Gross per 24 hour   Intake             2003 ml   Output             1925 ml   Net               78 ml        Physical Examination:             Constitutional:  No acute distress, cooperative, pleasant    ENT:  Oral mucous moist, oropharynx benign. Neck supple,    Resp:  CTA bilaterally. No wheezing/rhonchi/rales. No accessory muscle use   CV:  Regular rhythm, normal rate, no murmurs, gallops, rubs    GI:  Soft, non distended, Midline Abd incision in place, no dehiscence. non tender. normoactive bowel sounds, no hepatosplenomegaly     Musculoskeletal:  No edema, warm, 2+ pulses throughout    Neurologic:  Moves all extremities. AAOx3, CN II-XII reviewed     Psych:  Good insight, Not anxious nor agitated.        Data Review:    Review and/or order of clinical lab test  Review and/or order of tests in the radiology section of CPT  Decision to obtain old records and/or obtain history from someone other than the patient      Labs:     Recent Labs      12/19/17 0443 12/18/17 1917   WBC  7.6  9.8   HGB  10.1*  13.1   HCT  29.6*  38.5   PLT  116*  181     Recent Labs      12/19/17 0443 12/18/17 1917   NA  140  130*   K  4.7  4.3 CL  112*  97   CO2  16*  19*   BUN  38*  45*   CREA  3.00*  5.01*   GLU  125*  219*   CA  7.2*  9.1   MG  1.5*  1.8   PHOS  4.8*  7.3*     Recent Labs      12/19/17   0443  12/18/17 1917   SGOT  28  32   ALT  16  24   AP  53  87   TBILI  0.2  0.3   TP  5.9*  8.2   ALB  2.9*  4.1   GLOB  3.0  4.1*     No results for input(s): INR, PTP, APTT in the last 72 hours. No lab exists for component: INREXT   No results for input(s): FE, TIBC, PSAT, FERR in the last 72 hours. No results found for: FOL, RBCF   No results for input(s): PH, PCO2, PO2 in the last 72 hours.   Recent Labs      12/18/17 1917   CPK  370*   CKNDX  Cannot be calculated   TROIQ  <0.04     No results found for: CHOL, CHOLX, CHLST, CHOLV, HDL, LDL, LDLC, DLDLP, TGLX, TRIGL, TRIGP, CHHD, CHHDX  No results found for: [facility]  Lab Results   Component Value Date/Time    Color YELLOW/STRAW 12/18/2017 08:31 PM    Appearance CLOUDY 12/18/2017 08:31 PM    Specific gravity 1.012 12/18/2017 08:31 PM    pH (UA) 5.5 12/18/2017 08:31 PM    Protein 100 12/18/2017 08:31 PM    Glucose 100 12/18/2017 08:31 PM    Ketone NEGATIVE  12/18/2017 08:31 PM    Bilirubin NEGATIVE  12/18/2017 08:31 PM    Urobilinogen 0.2 12/18/2017 08:31 PM    Nitrites NEGATIVE  12/18/2017 08:31 PM    Leukocyte Esterase NEGATIVE  12/18/2017 08:31 PM    Epithelial cells MODERATE 12/18/2017 08:31 PM    Bacteria 1+ 12/18/2017 08:31 PM    WBC 0-4 12/18/2017 08:31 PM    RBC 0-5 12/18/2017 08:31 PM         Medications Reviewed:     Current Facility-Administered Medications   Medication Dose Route Frequency    sodium chloride (NS) flush 5-10 mL  5-10 mL IntraVENous Q8H    sodium chloride (NS) flush 5-10 mL  5-10 mL IntraVENous PRN    sodium chloride (NS) flush 5-10 mL  5-10 mL IntraVENous Q8H    sodium chloride (NS) flush 5-10 mL  5-10 mL IntraVENous PRN    morphine injection 2 mg  2 mg IntraVENous Q2H PRN    acetaminophen (OFIRMEV) infusion 1,000 mg  1,000 mg IntraVENous Q8H    sodium chloride (NS) flush 5-10 mL  5-10 mL IntraVENous Q8H    sodium chloride (NS) flush 5-10 mL  5-10 mL IntraVENous PRN    0.9% sodium chloride 1,000 mL with mvi, adult no. 4 with vit K 10 mL, thiamine 463 mg, folic acid 1 mg infusion   IntraVENous Q24H    LORazepam (ATIVAN) injection 1 mg  1 mg IntraVENous Q4H PRN    LORazepam (ATIVAN) injection 2 mg  2 mg IntraVENous Q6H PRN    piperacillin-tazobactam (ZOSYN) 6.75 g in 0.9% sodium chloride 500 mL infusion  6.75 g IntraVENous Q24H     ______________________________________________________________________  EXPECTED LENGTH OF STAY: 3d 16h  ACTUAL LENGTH OF STAY:          1                 Nila Chaidez MD

## 2017-12-19 NOTE — ED NOTES
Report given to OR nurse, patient transferred down in stable condition with RN and monitor. OR nurse aware this is an emergent case, CHG wipes not yet done. Family with patient.

## 2017-12-19 NOTE — PROGRESS NOTES
General Surgery Daily Progress Note    Admit Date: 2017  Post-Operative Day: 1 Day Post-Op from Procedure(s):  LAPAROTOMY EXPLORATORY     Subjective:     Last 24 hrs: Pt is still in the PACU. No complaints of pain. Only taking ice water. Doesn't want clears. No flatus. Cr down to 3.0    Objective:     Blood pressure 129/78, pulse (!) 58, temperature 97.9 °F (36.6 °C), resp. rate 16, height 5' 2\" (1.575 m), weight 90 lb 6.2 oz (41 kg), SpO2 96 %. Temp (24hrs), Av.8 °F (36.6 °C), Min:96.2 °F (35.7 °C), Max:99.1 °F (37.3 °C)      _____________________  Physical Exam:     Alert and Oriented, x3, in no acute distress. Cardiovascular: RRR, no peripheral edema  Lungs:CTAB anteriorally  Abdomen: flat, drsg w/ some bloody drainage - circled and not spreading at this time; diminished BS      Assessment:   Principal Problem:    Shock ([facility] [address] (2017)    Active Problems:    Lactic acidosis (2017)      Syncope (2017)      ARF (acute renal failure) ([facility] [address] (2017)      Symptomatic bradycardia (2017)      Pneumatosis intestinalis (2017)            Plan: To floor when bed available  Cont clears as tolerated  IVF  Cont zosyn  Cont to monitor renal function  Cardiology following    Data Review:    Recent Labs      17   WBC  7.6  9.8   HGB  10.1*  13.1   HCT  29.6*  38.5   PLT  116*  181     Recent Labs      173  17   NA  140  130*   K  4.7  4.3   CL  112*  97   CO2  16*  19*   GLU  125*  219*   BUN  38*  45*   CREA  3.00*  5.01*   CA  7.2*  9.1   MG  1.5*  1.8   PHOS  4.8*  7.3*   ALB  2.9*  4.1   SGOT  28  32   ALT  16  24     No results for input(s): AML, LPSE in the last 72 hours.         ______________________  Medications:    Current Facility-Administered Medications   Medication Dose Route Frequency    lactated Ringers infusion  125 mL/hr IntraVENous CONTINUOUS    0.9% sodium chloride infusion 1,000 mL  1,000 mL IntraVENous CONTINUOUS    sodium chloride (NS) flush 5-10 mL  5-10 mL IntraVENous PRN    oxyCODONE-acetaminophen (PERCOCET) 5-325 mg per tablet 1 Tab  1 Tab Oral PRN    morphine injection 2 mg  2 mg IntraVENous Multiple    ondansetron (ZOFRAN) injection 4 mg  4 mg IntraVENous PRN    midazolam (VERSED) injection 0.5 mg  0.5 mg IntraVENous Q5MIN PRN    diphenhydrAMINE (BENADRYL) injection 12.5 mg  12.5 mg IntraVENous PRN    meperidine (DEMEROL) injection 12.5 mg  12.5 mg IntraVENous ONCE    acetaminophen (OFIRMEV) infusion 1,000 mg  1,000 mg IntraVENous Q8H    0.9% sodium chloride infusion  125 mL/hr IntraVENous CONTINUOUS    sodium chloride (NS) flush 5-10 mL  5-10 mL IntraVENous Q8H    sodium chloride (NS) flush 5-10 mL  5-10 mL IntraVENous PRN    0.9% sodium chloride 1,000 mL with mvi, adult no. 4 with vit K 10 mL, thiamine 959 mg, folic acid 1 mg infusion   IntraVENous Q24H    LORazepam (ATIVAN) injection 1 mg  1 mg IntraVENous Q4H PRN    LORazepam (ATIVAN) injection 2 mg  2 mg IntraVENous Q6H PRN    piperacillin-tazobactam (ZOSYN) 6.75 g in 0.9% sodium chloride 500 mL infusion  6.75 g IntraVENous Q24H    0.9% sodium chloride infusion  50 mL/hr IntraVENous CONTINUOUS    fentaNYL citrate (PF) injection 50 mcg  50 mcg IntraVENous PRN    midazolam (VERSED) injection 1 mg  1 mg IntraVENous PRN    piperacillin-tazobactam (ZOSYN) 3.375 gram injection        Venessa Rizvi NP  12/19/2017

## 2017-12-20 LAB
ANION GAP SERPL CALC-SCNC: 7 MMOL/L (ref 5–15)
BACTERIA SPEC CULT: NORMAL
BASOPHILS # BLD: 0 K/UL (ref 0–0.1)
BASOPHILS NFR BLD: 0 % (ref 0–1)
BUN SERPL-MCNC: 24 MG/DL (ref 6–20)
BUN/CREAT SERPL: 12 (ref 12–20)
CALCIUM SERPL-MCNC: 7.3 MG/DL (ref 8.5–10.1)
CC UR VC: NORMAL
CHLORIDE SERPL-SCNC: 106 MMOL/L (ref 97–108)
CO2 SERPL-SCNC: 24 MMOL/L (ref 21–32)
CREAT SERPL-MCNC: 1.95 MG/DL (ref 0.7–1.3)
EOSINOPHIL # BLD: 0 K/UL (ref 0–0.4)
EOSINOPHIL NFR BLD: 0 % (ref 0–7)
ERYTHROCYTE [DISTWIDTH] IN BLOOD BY AUTOMATED COUNT: 13.8 % (ref 11.5–14.5)
EST. AVERAGE GLUCOSE BLD GHB EST-MCNC: 120 MG/DL
GLUCOSE SERPL-MCNC: 110 MG/DL (ref 65–100)
HBA1C MFR BLD: 5.8 % (ref 4.2–6.3)
HBV SURFACE AB SER QL: NONREACTIVE
HBV SURFACE AB SER-ACNC: <3.1 MIU/ML
HCT VFR BLD AUTO: 28.3 % (ref 36.6–50.3)
HCV AB SERPL QL IA: NONREACTIVE
HCV COMMENT,HCGAC: NORMAL
HGB BLD-MCNC: 9.7 G/DL (ref 12.1–17)
LYMPHOCYTES # BLD: 0.7 K/UL (ref 0.8–3.5)
LYMPHOCYTES NFR BLD: 10 % (ref 12–49)
MCH RBC QN AUTO: 30.4 PG (ref 26–34)
MCHC RBC AUTO-ENTMCNC: 34.3 G/DL (ref 30–36.5)
MCV RBC AUTO: 88.7 FL (ref 80–99)
MONOCYTES # BLD: 0.3 K/UL (ref 0–1)
MONOCYTES NFR BLD: 4 % (ref 5–13)
NEUTS SEG # BLD: 6.8 K/UL (ref 1.8–8)
NEUTS SEG NFR BLD: 86 % (ref 32–75)
PLATELET # BLD AUTO: 121 K/UL (ref 150–400)
POTASSIUM SERPL-SCNC: 3.7 MMOL/L (ref 3.5–5.1)
RBC # BLD AUTO: 3.19 M/UL (ref 4.1–5.7)
SERVICE CMNT-IMP: NORMAL
SODIUM SERPL-SCNC: 137 MMOL/L (ref 136–145)
WBC # BLD AUTO: 7.8 K/UL (ref 4.1–11.1)

## 2017-12-20 PROCEDURE — 36415 COLL VENOUS BLD VENIPUNCTURE: CPT | Performed by: HOSPITALIST

## 2017-12-20 PROCEDURE — 80048 BASIC METABOLIC PNL TOTAL CA: CPT | Performed by: HOSPITALIST

## 2017-12-20 PROCEDURE — 74011250636 HC RX REV CODE- 250/636: Performed by: INTERNAL MEDICINE

## 2017-12-20 PROCEDURE — 74011250636 HC RX REV CODE- 250/636: Performed by: EMERGENCY MEDICINE

## 2017-12-20 PROCEDURE — 83036 HEMOGLOBIN GLYCOSYLATED A1C: CPT | Performed by: HOSPITALIST

## 2017-12-20 PROCEDURE — 74011250636 HC RX REV CODE- 250/636: Performed by: HOSPITALIST

## 2017-12-20 PROCEDURE — 81001 URINALYSIS AUTO W/SCOPE: CPT | Performed by: INTERNAL MEDICINE

## 2017-12-20 PROCEDURE — 81003 URINALYSIS AUTO W/O SCOPE: CPT | Performed by: INTERNAL MEDICINE

## 2017-12-20 PROCEDURE — 74011250636 HC RX REV CODE- 250/636: Performed by: SURGERY

## 2017-12-20 PROCEDURE — 87040 BLOOD CULTURE FOR BACTERIA: CPT | Performed by: INTERNAL MEDICINE

## 2017-12-20 PROCEDURE — 74011000250 HC RX REV CODE- 250: Performed by: FAMILY MEDICINE

## 2017-12-20 PROCEDURE — 74011250637 HC RX REV CODE- 250/637: Performed by: INTERNAL MEDICINE

## 2017-12-20 PROCEDURE — 85025 COMPLETE CBC W/AUTO DIFF WBC: CPT | Performed by: HOSPITALIST

## 2017-12-20 PROCEDURE — 65660000000 HC RM CCU STEPDOWN

## 2017-12-20 PROCEDURE — 74011250636 HC RX REV CODE- 250/636: Performed by: FAMILY MEDICINE

## 2017-12-20 PROCEDURE — 74011250637 HC RX REV CODE- 250/637: Performed by: HOSPITALIST

## 2017-12-20 RX ORDER — AMLODIPINE BESYLATE 5 MG/1
5 TABLET ORAL DAILY
Status: DISCONTINUED | OUTPATIENT
Start: 2017-12-20 | End: 2017-12-24 | Stop reason: HOSPADM

## 2017-12-20 RX ORDER — ALBUTEROL SULFATE 0.83 MG/ML
5 SOLUTION RESPIRATORY (INHALATION)
Status: DISCONTINUED | OUTPATIENT
Start: 2017-12-21 | End: 2017-12-20

## 2017-12-20 RX ORDER — ACETAMINOPHEN 325 MG/1
650 TABLET ORAL
Status: DISCONTINUED | OUTPATIENT
Start: 2017-12-20 | End: 2017-12-24 | Stop reason: HOSPADM

## 2017-12-20 RX ORDER — SODIUM CHLORIDE, SODIUM LACTATE, POTASSIUM CHLORIDE, CALCIUM CHLORIDE 600; 310; 30; 20 MG/100ML; MG/100ML; MG/100ML; MG/100ML
25 INJECTION, SOLUTION INTRAVENOUS CONTINUOUS
Status: DISCONTINUED | OUTPATIENT
Start: 2017-12-20 | End: 2017-12-24

## 2017-12-20 RX ORDER — ALBUTEROL SULFATE 0.83 MG/ML
5 SOLUTION RESPIRATORY (INHALATION)
Status: DISCONTINUED | OUTPATIENT
Start: 2017-12-20 | End: 2017-12-23

## 2017-12-20 RX ADMIN — PIPERACILLIN AND TAZOBACTAM 10.12 G: 3; .375 INJECTION, POWDER, FOR SOLUTION INTRAVENOUS at 12:51

## 2017-12-20 RX ADMIN — SODIUM CHLORIDE, SODIUM LACTATE, POTASSIUM CHLORIDE, AND CALCIUM CHLORIDE 75 ML/HR: 600; 310; 30; 20 INJECTION, SOLUTION INTRAVENOUS at 09:42

## 2017-12-20 RX ADMIN — Medication 10 ML: at 22:00

## 2017-12-20 RX ADMIN — Medication 10 ML: at 07:22

## 2017-12-20 RX ADMIN — ACETAMINOPHEN 1000 MG: 10 INJECTION, SOLUTION INTRAVENOUS at 00:27

## 2017-12-20 RX ADMIN — Medication 10 ML: at 06:00

## 2017-12-20 RX ADMIN — Medication 10 ML: at 15:54

## 2017-12-20 RX ADMIN — ACETAMINOPHEN 650 MG: 325 TABLET, FILM COATED ORAL at 23:26

## 2017-12-20 RX ADMIN — PIPERACILLIN SODIUM AND TAZOBACTAM SODIUM 6.75 G: 3; .375 INJECTION, POWDER, LYOPHILIZED, FOR SOLUTION INTRAVENOUS at 00:27

## 2017-12-20 RX ADMIN — AMLODIPINE BESYLATE 5 MG: 5 TABLET ORAL at 16:44

## 2017-12-20 RX ADMIN — ACETAMINOPHEN 1000 MG: 10 INJECTION, SOLUTION INTRAVENOUS at 09:42

## 2017-12-20 RX ADMIN — FOLIC ACID: 5 INJECTION, SOLUTION INTRAMUSCULAR; INTRAVENOUS; SUBCUTANEOUS at 23:11

## 2017-12-20 NOTE — PROGRESS NOTES
Bedside shift change report given to JOEY Bonilla (oncoming nurse) by Tamia Martin (offgoing nurse). Report included the following information SBAR, Kardex, MAR, Accordion and Recent Results.

## 2017-12-20 NOTE — PROGRESS NOTES
Day #3 of Zosyn  Indication:  intra-abdominal infection    Recent Labs      12/20/17   0505  12/19/17   0443  12/18/17   1917   WBC  7.8  7.6  9.8   CREA  1.95*  3.00*  5.01*   BUN  24*  38*  45*   Estimated Clcr>20ml/min  Plan:  Change to Zosyn 10.125gm IV continuously

## 2017-12-20 NOTE — PROGRESS NOTES
Hospitalist Progress Note  Elvia Melendez MD  Answering service: [phone] OR [phone] from in house phone      Date of Service:  2017  NAME:  Lizzeth Thomas  :  1955  MRN:  [id number]      Admission Summary:    A 36-year-old -American male with past medical history of gout and hypertension presented to the Emergency Department from home accompanied by a sister and brother with chief complaint of abdominal pain. Interval history / Subjective:     Patient seen in followup up of abdominal pain,he is denying any pains. He reports that he hasnt moved his bowels yet.       Assessment & Plan:     S/P shock from hypovolemia and hypotension  Improved with IVF and Holding PTA bp meds    Abnormal CT scan on admission with possible acute ischemic bowel (on ct scan on admission)  S/p ex lap - unremarkable with normal bowel - possible recovery of ischemic bowel   Continue following for resolution of bowel function  Surgery following  Diet as tolerated    Diarrhea on admission  R/O sepsis   Follow up cultures    Lactic acidosis  From shock- reolved    ARCADIO on admission with metabolic acidosis  Possible prerenal in nature versus ATN from Hypotension   Off bicarb as acidosis resolved  Improving with hydration  Renal dose medications  Hold ACE/HCTZ  Renal US negative    Bradycardia  Likely vasovagal  Resolved - tele showing nsr  Followup Echo - Normal EF    Etoh Abuse  On CIWA protocol    Hyperglycemia  Check A1c  Check Finsgersticks    Cachexia -- underweight on admission  Follow up HIV  Hepatitis panel neg     - had ex lap for diagnosis of ischemic bowel   Code status: Full  DVT prophylaxis: SCD    Care Plan discussed with: Patient/Family, Nurse and   Disposition: TBD     Hospital Problems  Date Reviewed: 2017          Codes Class Noted POA    Lactic acidosis ICD-10-CM: E87.2  ICD-9-CM: 276.2  2017 Yes        * (Principal)Shock [facility]) ICD-10-CM: R57.9  ICD-9-CM: 785.50  12/18/2017 Yes        Syncope ICD-10-CM: R55  ICD-9-CM: 780.2  12/18/2017 Yes        ARF (acute renal failure) (HCC) ICD-10-CM: N17.9  ICD-9-CM: 584.9  12/18/2017 Yes        Symptomatic bradycardia ICD-10-CM: R00.1  ICD-9-CM: 427.89  12/18/2017 Yes        Pneumatosis intestinalis ICD-10-CM: C56.86  ICD-9-CM: 569.89  12/18/2017 Yes                Review of Systems:   A comprehensive review of systems was negative except for that written in the HPI. Vital Signs:    Last 24hrs VS reviewed since prior progress note. Most recent are:  Visit Vitals    /89 (BP 1 Location: Left arm, BP Patient Position: At rest)    Pulse 82    Temp 99.4 °F (37.4 °C)    Resp 16    Ht 5' 2\" (1.575 m)    Wt 46.2 kg (101 lb 13.6 oz)    SpO2 95%    BMI 18.63 kg/m2         Intake/Output Summary (Last 24 hours) at 12/20/17 2407  Last data filed at 12/20/17 0417   Gross per 24 hour   Intake           868.34 ml   Output             1675 ml   Net          -806.66 ml        Physical Examination:             Constitutional:  No acute distress, cooperative, pleasant    ENT:  Oral mucous moist, oropharynx benign. Neck supple,    Resp:  CTA bilaterally. No wheezing/rhonchi/rales. No accessory muscle use   CV:  Regular rhythm, normal rate, no murmurs, gallops, rubs    GI:  Soft, non distended, Midline Abd incision in place, no dehiscence. non tender. normoactive bowel sounds, no hepatosplenomegaly     Musculoskeletal:  No edema, warm, 2+ pulses throughout    Neurologic:  Moves all extremities. AAOx3, CN II-XII reviewed     Psych:  Good insight, Not anxious nor agitated.        Data Review:    Review and/or order of clinical lab test  Review and/or order of tests in the radiology section of CPT  Decision to obtain old records and/or obtain history from someone other than the patient      Labs:     Recent Labs      12/20/17   0505  12/19/17   0443   WBC  7.8  7.6   HGB  9.7* 10.1*   HCT  28.3*  29.6*   PLT  121*  116*     Recent Labs      12/20/17   0505  12/19/17 0443  12/18/17 1917   NA  137  140  130*   K  3.7  4.7  4.3   CL  106  112*  97   CO2  24  16*  19*   BUN  24*  38*  45*   CREA  1.95*  3.00*  5.01*   GLU  110*  125*  219*   CA  7.3*  7.2*  9.1   MG   --   1.5*  1.8   PHOS   --   4.8*  7.3*     Recent Labs      12/19/17   0443  12/18/17 1917   SGOT  28  32   ALT  16  24   AP  53  87   TBILI  0.2  0.3   TP  5.9*  8.2   ALB  2.9*  4.1   GLOB  3.0  4.1*     No results for input(s): INR, PTP, APTT in the last 72 hours. No lab exists for component: INREXT, INREXT   No results for input(s): FE, TIBC, PSAT, FERR in the last 72 hours. No results found for: FOL, RBCF   No results for input(s): PH, PCO2, PO2 in the last 72 hours. Recent Labs      12/18/17 1917   CPK  370*   CKNDX  Cannot be calculated   TROIQ  <0.04     No results found for: CHOL, CHOLX, CHLST, CHOLV, HDL, LDL, LDLC, DLDLP, TGLX, TRIGL, TRIGP, CHHD, CHHDX  No results found for: [facility]  Lab Results   Component Value Date/Time    Color YELLOW/STRAW 12/18/2017 08:31 PM    Appearance CLOUDY 12/18/2017 08:31 PM    Specific gravity 1.012 12/18/2017 08:31 PM    pH (UA) 5.5 12/18/2017 08:31 PM    Protein 100 12/18/2017 08:31 PM    Glucose 100 12/18/2017 08:31 PM    Ketone NEGATIVE  12/18/2017 08:31 PM    Bilirubin NEGATIVE  12/18/2017 08:31 PM    Urobilinogen 0.2 12/18/2017 08:31 PM    Nitrites NEGATIVE  12/18/2017 08:31 PM    Leukocyte Esterase NEGATIVE  12/18/2017 08:31 PM    Epithelial cells MODERATE 12/18/2017 08:31 PM    Bacteria 1+ 12/18/2017 08:31 PM    WBC 0-4 12/18/2017 08:31 PM    RBC 0-5 12/18/2017 08:31 PM     ECHO  Left ventricle: Systolic function was normal. Ejection fraction was  estimated in the range of 55 % to 60 %. There were no regional wall motion  abnormalities.     Medications Reviewed:     Current Facility-Administered Medications   Medication Dose Route Frequency    lactated Ringers infusion  75 mL/hr IntraVENous CONTINUOUS    sodium chloride (NS) flush 5-10 mL  5-10 mL IntraVENous Q8H    sodium chloride (NS) flush 5-10 mL  5-10 mL IntraVENous PRN    sodium chloride (NS) flush 5-10 mL  5-10 mL IntraVENous Q8H    sodium chloride (NS) flush 5-10 mL  5-10 mL IntraVENous PRN    morphine injection 2 mg  2 mg IntraVENous Q2H PRN    acetaminophen (OFIRMEV) infusion 1,000 mg  1,000 mg IntraVENous Q8H    sodium chloride (NS) flush 5-10 mL  5-10 mL IntraVENous Q8H    sodium chloride (NS) flush 5-10 mL  5-10 mL IntraVENous PRN    0.9% sodium chloride 1,000 mL with mvi, adult no. 4 with vit K 10 mL, thiamine 268 mg, folic acid 1 mg infusion   IntraVENous Q24H    LORazepam (ATIVAN) injection 1 mg  1 mg IntraVENous Q4H PRN    LORazepam (ATIVAN) injection 2 mg  2 mg IntraVENous Q6H PRN    piperacillin-tazobactam (ZOSYN) 6.75 g in 0.9% sodium chloride 500 mL infusion  6.75 g IntraVENous Q24H     ______________________________________________________________________  EXPECTED LENGTH OF STAY: 3d 16h  ACTUAL LENGTH OF STAY:          2                 Aruna Kelley MD

## 2017-12-20 NOTE — PROGRESS NOTES
NUTRITION COMPLETE ASSESSMENT    RECOMMENDATIONS:   1. Encourage frequent sips of liquids  2. Advance diet per GI to Regular  3. Check standing weight      Interventions/Plan:   Food/Nutrient Delivery:    Commercial supplement      ensure clear  Nutrition Education:     Coordination of Care:    Nutrition Counseling:        Assessment:   Reason for Assessment:   [] Provider Consult  []BPA/MST Referral   []LOS   []Reassessment   []NPO/Clear Liquid   [x]At Nutrition Risk - low wt  []Other    Diet: Clear liquids  Supplements: none  Nutritionally Significant Medications: [x] Reviewed & Includes: zosyn, 75mL/hr Lactated Ringers    Meal Intake: Patient Vitals for the past 100 hrs:   % Diet Eaten   12/19/17 1200 100 %       Subjective:  Visited with patient. \"I don't know what's going on but I just don't feel like eating. \"    Objective:  Pt admitted with abd pain. S/P shock from hypovolemia - resolving. CT findings were suspected ischemic bowel, pt is POD#2 from exploratory Lap, GI surgery notes no abnormal findings. Pt is below stated usual wt of 115# however, review of medical record & family report pt's wt hasn't ever been >105#. Clear Liquids diet has been offered, pt states he doesn't feel hungry but drinking does not increase pain or cause nausea. GI is awaiting strong BS to advance diet. Labs reviewed, ARCADIO resolving per Nephrology. PTA pt reports to eat 1 meal per day (dinner) & 1-2 snacks daily, usually drinks soda and water, sometimes has constipation. Estimated Nutrition Needs:   Kcals/day: 1490 Kcals/day  Protein: 60 g (1.3 g/kg of current wt)  Fluid: 1386 ml (30 mL/kg of current wt)     Based On: [facility] (AF 1.3)  Weight Used: Actual wt (46.2 kg)    Pt expected to meet estimated nutrient needs:    []   Yes     []  No       [x] Unable to predict at this time      Nutrition Diagnosis:   1.  Inadequate oral intake related to diet order as evidenced by clear liquids diet, post op day #2, awaiting return of strong +BS, ~10-12% below UBW      Goals:     pt to tolerate PO within 3 days      Monitoring & Evaluation:    - Total energy intake   - Weight/weight change, GI profile   -      Previous Nutrition Goals Met:  N/A    Previous Recommendations:      N/A    Education & Discharge Needs:   [x] None Identified   [] Identified and addressed    [x] Participated in care plan, discharge planning, and/or interdisciplinary rounds        Cultural, Catholic and ethnic food preferences identified:   None    Skin Integrity: []Intact  [x] abdomen surgical incision   Edema: [x]None []Other  Last BM: 12/18  ABD: flat, soft  Food Allergies: [x]None []Other    Anthropometrics:    Weight Loss Metrics 12/20/2017 7/21/2017 7/20/2017   Today's Wt 101 lb 13.6 oz 95 lb 14.4 oz 98 lb 1.7 oz   BMI 18.63 kg/m2 17.54 kg/m2 17.94 kg/m2      Last 3 Recorded Weights in this Encounter    12/18/17 1909 12/20/17 0417   Weight: 41 kg (90 lb 6.2 oz) 46.2 kg (101 lb 13.6 oz)      Weight Source: Bed  Height: 5' 2\" (157.5 cm),    Body mass index is 18.63 kg/(m^2). IBW : 53.5 kg (118 lb),    Usual Body Weight: 52.2 kg (115 lb),      Labs:  Lab Results   Component Value Date/Time    Sodium 137 12/20/2017 05:05 AM    Potassium 3.7 12/20/2017 05:05 AM    Chloride 106 12/20/2017 05:05 AM    CO2 24 12/20/2017 05:05 AM    Glucose 110 12/20/2017 05:05 AM    BUN 24 12/20/2017 05:05 AM    Creatinine 1.95 12/20/2017 05:05 AM    Calcium 7.3 12/20/2017 05:05 AM    Magnesium 1.5 12/19/2017 04:43 AM    Phosphorus 4.8 12/19/2017 04:43 AM    Albumin 2.9 12/19/2017 04:43 AM     Lab Results   Component Value Date/Time    Hemoglobin A1c 5.8 12/20/2017 05:05 AM     Lab Results   Component Value Date/Time    Glucose 110 12/20/2017 05:05 AM      Lab Results   Component Value Date/Time    ALT (SGPT) 16 12/19/2017 04:43 AM    AST (SGOT) 28 12/19/2017 04:43 AM    Alk.  phosphatase 53 12/19/2017 04:43 AM    Bilirubin, total 0.2 12/19/2017 04:43 AM        Ramiro Burnette, RD, MS, CDE

## 2017-12-20 NOTE — PROGRESS NOTES
TRANSFER - IN REPORT:    Verbal report received from April RN (name) on [place] [facility]  being received from [facility] (unit) for routine progression of care      Report consisted of patients Situation, Background, Assessment and   Recommendations(SBAR). Information from the following report(s) SBAR, Kardex, Intake/Output, MAR and Recent Results was reviewed with the receiving nurse. Opportunity for questions and clarification was provided. Assessment completed upon patients arrival to unit and care assumed.

## 2017-12-20 NOTE — PROGRESS NOTES
[facility]   [address] [facility], [facility], [facility]  Phone: [phone]   [phone]       Nephrology Progress Note  Matthew Schulte     [birth year]     [id number]  Date of Admission : 12/18/2017 12/20/17    CC: Follow up for ARCADIO     Assessment and Plan   ARCADIO :   - perfusional injury   - resolving   - changed IVF to LR at 75 cc/ hr     Metabolic acidosis : resolved   - stopped Bicarb gtt    Hypokalemia :  - replete PRN        Interval History:  Seen and examined   Doing well   No abdo pain   BP stable   HR stable     Review of Systems: Pertinent items are noted in HPI. Current Medications:   Current Facility-Administered Medications   Medication Dose Route Frequency    lactated Ringers infusion  75 mL/hr IntraVENous CONTINUOUS    sodium chloride (NS) flush 5-10 mL  5-10 mL IntraVENous Q8H    sodium chloride (NS) flush 5-10 mL  5-10 mL IntraVENous PRN    sodium chloride (NS) flush 5-10 mL  5-10 mL IntraVENous Q8H    sodium chloride (NS) flush 5-10 mL  5-10 mL IntraVENous PRN    morphine injection 2 mg  2 mg IntraVENous Q2H PRN    acetaminophen (OFIRMEV) infusion 1,000 mg  1,000 mg IntraVENous Q8H    sodium chloride (NS) flush 5-10 mL  5-10 mL IntraVENous Q8H    sodium chloride (NS) flush 5-10 mL  5-10 mL IntraVENous PRN    0.9% sodium chloride 1,000 mL with mvi, adult no. 4 with vit K 10 mL, thiamine 206 mg, folic acid 1 mg infusion   IntraVENous Q24H    LORazepam (ATIVAN) injection 1 mg  1 mg IntraVENous Q4H PRN    LORazepam (ATIVAN) injection 2 mg  2 mg IntraVENous Q6H PRN    piperacillin-tazobactam (ZOSYN) 6.75 g in 0.9% sodium chloride 500 mL infusion  6.75 g IntraVENous Q24H      No Known Allergies    Objective:  Vitals:    Vitals:    12/19/17 1933 12/19/17 2339 12/20/17 0417 12/20/17 0830   BP: 126/83 134/84 140/83 142/89   Pulse: 73 81 82 82   Resp: 17 18 16 16   Temp: 99.3 °F (37.4 °C) 99.9 °F (37.7 °C) 99.4 °F (37.4 °C) 99.4 °F (37.4 °C)   SpO2: 96% 92% 95% 95%   Weight:   46.2 kg (101 lb 13.6 oz)    Height:         Intake and Output:     12/18 1901 - 12/20 0700  In: 2671.3 [P.O.:200; I.V.:2471.3]  Out: 2800 [Urine:2775]    Physical Examination:    General: NAD,Conversant   Neck:  Supple, no mass  Resp:  Lungs CTA B/L, no wheezing , normal respiratory effort  CV:  RRR,  no murmur or rub, LE edema  GI:  Midline incision, sof5t, NT  Neurologic:  Non focal  Psych:             AAO x 3 appropriate affect   Skin:  No Rash  :  +chung     []    High complexity decision making was performed  []    Patient is at high-risk of decompensation with multiple organ involvement    Lab Data Personally Reviewed: I have reviewed all the pertinent labs, microbiology data and radiology studies during assessment.     Recent Labs      12/20/17   0505  12/19/17 0443  12/18/17 1917   NA  137  140  130*   K  3.7  4.7  4.3   CL  106  112*  97   CO2  24  16*  19*   GLU  110*  125*  219*   BUN  24*  38*  45*   CREA  1.95*  3.00*  5.01*   CA  7.3*  7.2*  9.1   MG   --   1.5*  1.8   PHOS   --   4.8*  7.3*   ALB   --   2.9*  4.1   SGOT   --   28  32   ALT   --   16  24     Recent Labs      12/20/17   0505  12/19/17   0443  12/18/17 1917   WBC  7.8  7.6  9.8   HGB  9.7*  10.1*  13.1   HCT  28.3*  29.6*  38.5   PLT  121*  116*  181     No results found for: SDES  Lab Results   Component Value Date/Time    Culture result: NO GROWTH 2 DAYS 12/18/2017 08:31 PM    Culture result: NO GROWTH 2 DAYS 12/18/2017 07:24 PM     Recent Results (from the past 24 hour(s))   CBC WITH AUTOMATED DIFF    Collection Time: 12/20/17  5:05 AM   Result Value Ref Range    WBC 7.8 4.1 - 11.1 K/uL    RBC 3.19 (L) 4.10 - 5.70 M/uL    HGB 9.7 (L) 12.1 - 17.0 g/dL    HCT 28.3 (L) 36.6 - 50.3 %    MCV 88.7 80.0 - 99.0 FL    MCH 30.4 26.0 - 34.0 PG    MCHC 34.3 30.0 - 36.5 g/dL    RDW 13.8 11.5 - 14.5 %    PLATELET 294 (L) 140 - 400 K/uL    NEUTROPHILS 86 (H) 32 - 75 %    LYMPHOCYTES 10 (L) 12 - 49 %    MONOCYTES 4 (L) 5 - 13 % EOSINOPHILS 0 0 - 7 %    BASOPHILS 0 0 - 1 %    ABS. NEUTROPHILS 6.8 1.8 - 8.0 K/UL    ABS. LYMPHOCYTES 0.7 (L) 0.8 - 3.5 K/UL    ABS. MONOCYTES 0.3 0.0 - 1.0 K/UL    ABS. EOSINOPHILS 0.0 0.0 - 0.4 K/UL    ABS. BASOPHILS 0.0 0.0 - 0.1 K/UL   METABOLIC PANEL, BASIC    Collection Time: 12/20/17  5:05 AM   Result Value Ref Range    Sodium 137 136 - 145 mmol/L    Potassium 3.7 3.5 - 5.1 mmol/L    Chloride 106 97 - 108 mmol/L    CO2 24 21 - 32 mmol/L    Anion gap 7 5 - 15 mmol/L    Glucose 110 (H) 65 - 100 mg/dL    BUN 24 (H) 6 - 20 MG/DL    Creatinine 1.95 (H) 0.70 - 1.30 MG/DL    BUN/Creatinine ratio 12 12 - 20      GFR est AA 43 (L) >60 ml/min/1.73m2    GFR est non-AA 35 (L) >60 ml/min/1.73m2    Calcium 7.3 (L) 8.5 - 10.1 MG/DL   HEMOGLOBIN A1C WITH EAG    Collection Time: 12/20/17  5:05 AM   Result Value Ref Range    Hemoglobin A1c 5.8 4.2 - 6.3 %    Est. average glucose 120 mg/dL           I have reviewed the flowsheets. Chart and Pertinent Notes have been reviewed. No change in PMH ,family and social history from Consult note.       Ronald Vásquez MD

## 2017-12-20 NOTE — ROUTINE PROCESS
Bedside shift change report given to Cece Sanchez (oncoming nurse) by Stephanie Cabral RN (offgoing nurse). Report included the following information SBAR, ED Summary, Procedure Summary, Intake/Output, Recent Results, Med Rec Status and Cardiac Rhythm NSR/ sinus bradycardia .

## 2017-12-20 NOTE — PROGRESS NOTES
Problem: Falls - Risk of  Goal: *Absence of Falls  Document Scott Fall Risk and appropriate interventions in the flowsheet.    Outcome: Progressing Towards Goal  Fall Risk Interventions:  Mobility Interventions: Patient to call before getting OOB, Bed/chair exit alarm         Medication Interventions: Patient to call before getting OOB, Bed/chair exit alarm         History of Falls Interventions: Bed/chair exit alarm, Investigate reason for fall

## 2017-12-20 NOTE — PROGRESS NOTES
[facility] [address] [place] [address]. Rounded with Dr. Martínez Laird. Met briefly with patient. Brief review of chart. Awaiting PT/OT evals for assistance with discharge planning. Patient lives with mother (currently hospitalized and going home 12/22) and sister. Has [employer] [facility]. CRM following for completion of d/c plan.   Osman De Jesus, RASHEED, CCM

## 2017-12-20 NOTE — PROGRESS NOTES
Daily Progress Note  [facility] at [address] Date: 2017  Post-Operative Day: 2 from Procedure(s):  LAPAROTOMY EXPLORATORY     Subjective:     Last 24 hrs: Pain well controlled. Feels rumbling in his belly, but no flatus or BM yet. No appetite, so not eating much. Getting OOB/ambulating. WBC 7.8, T max 99.9, on zosyn. Objective:     Blood pressure 142/89, pulse 82, temperature 99.4 °F (37.4 °C), resp. rate 16, height 5' 2\" (1.575 m), weight 46.2 kg (101 lb 13.6 oz), SpO2 95 %. Temp (24hrs), Av.8 °F (37.1 °C), Min:97.6 °F (36.4 °C), Max:99.9 °F (37.7 °C)      _____________________  Physical Exam:     Alert and Oriented, lying in bed, no acute distress. Cardiovascular: RRR, no peripheral edema  Lungs:CTAB   Abdomen: + BS, soft, mild distention, appropriately tender. Midline incision with some ss drainage on dressing, no erythema or induration.        Assessment:   Principal Problem:    Shock ([address] (2017)    Active Problems:    Lactic acidosis (2017)      Syncope (2017)      ARF (acute renal failure) ([address] (2017)      Symptomatic bradycardia (2017)      Pneumatosis intestinalis (2017)            Plan:     Await return of bowel function  May advance diet as tolerated from surgical perspective  Following        Lonney Cowden, [address] [facility] [place] [address],  [facility], [address] [place] [address]  [phone]    Data Review:    Recent Labs      17   0505  17   0443  17   1917   WBC  7.8  7.6  9.8   HGB  9.7*  10.1*  13.1   HCT  28.3*  29.6*  38.5   PLT  121*  116*  181     Recent Labs      17   0505  17   0443  17   NA  137  140  130*   K  3.7  4.7  4.3   CL  106  112*  97   CO2  24  16*  19*   GLU  110*  125*  219*   BUN  24*  38*  45*   CREA  1.95*  3.00*  5.01*   CA  7.3*  7.2*  9.1   MG   --   1.5*  1.8   PHOS   --   4.8*  7.3*   ALB   --   2.9*  4.1   TBILI --   0.2  0.3   SGOT   --   28  32   ALT   --   16  24     No results for input(s): AML, LPSE in the last 72 hours. ______________________  Medications:    Current Facility-Administered Medications   Medication Dose Route Frequency    lactated Ringers infusion  75 mL/hr IntraVENous CONTINUOUS    sodium chloride (NS) flush 5-10 mL  5-10 mL IntraVENous Q8H    sodium chloride (NS) flush 5-10 mL  5-10 mL IntraVENous PRN    sodium chloride (NS) flush 5-10 mL  5-10 mL IntraVENous Q8H    sodium chloride (NS) flush 5-10 mL  5-10 mL IntraVENous PRN    morphine injection 2 mg  2 mg IntraVENous Q2H PRN    acetaminophen (OFIRMEV) infusion 1,000 mg  1,000 mg IntraVENous Q8H    sodium chloride (NS) flush 5-10 mL  5-10 mL IntraVENous Q8H    sodium chloride (NS) flush 5-10 mL  5-10 mL IntraVENous PRN    0.9% sodium chloride 1,000 mL with mvi, adult no. 4 with vit K 10 mL, thiamine 970 mg, folic acid 1 mg infusion   IntraVENous Q24H    LORazepam (ATIVAN) injection 1 mg  1 mg IntraVENous Q4H PRN    LORazepam (ATIVAN) injection 2 mg  2 mg IntraVENous Q6H PRN    piperacillin-tazobactam (ZOSYN) 6.75 g in 0.9% sodium chloride 500 mL infusion  6.75 g IntraVENous Q24H     Patient independently interviewed and examined; agree with NP assessment and plan. He notes mild abdominal pain; no BM or flatus but he is ambulating. Will allow trial of clear liquids.

## 2017-12-21 ENCOUNTER — APPOINTMENT (OUTPATIENT)
Dept: GENERAL RADIOLOGY | Age: 62
DRG: 356 | End: 2017-12-21
Attending: INTERNAL MEDICINE
Payer: MEDICARE

## 2017-12-21 LAB
ALBUMIN SERPL ELPH-MCNC: 2.8 G/DL (ref 2.9–4.4)
ALBUMIN SERPL-MCNC: 2.8 G/DL (ref 3.5–5)
ALBUMIN/GLOB SERPL: 1.4 {RATIO} (ref 0.7–1.7)
ALPHA1 GLOB SERPL ELPH-MCNC: 0.2 G/DL (ref 0–0.4)
ALPHA2 GLOB SERPL ELPH-MCNC: 0.6 G/DL (ref 0.4–1)
ANION GAP SERPL CALC-SCNC: 8 MMOL/L (ref 5–15)
APPEARANCE UR: CLEAR
B-GLOBULIN SERPL ELPH-MCNC: 0.6 G/DL (ref 0.7–1.3)
BACTERIA URNS QL MICRO: NEGATIVE /HPF
BASOPHILS # BLD: 0 K/UL (ref 0–0.1)
BASOPHILS NFR BLD: 0 % (ref 0–1)
BILIRUB UR QL: NEGATIVE
BUN SERPL-MCNC: 12 MG/DL (ref 6–20)
BUN/CREAT SERPL: 9 (ref 12–20)
C DIFF TOX GENS STL QL NAA+PROBE: NEGATIVE
CALCIUM SERPL-MCNC: 7.6 MG/DL (ref 8.5–10.1)
CHLORIDE SERPL-SCNC: 99 MMOL/L (ref 97–108)
CK MB CFR SERPL CALC: 0.1 % (ref 0–2.5)
CK MB SERPL-MCNC: 1.4 NG/ML (ref 5–25)
CK SERPL-CCNC: 1378 U/L (ref 39–308)
CO2 SERPL-SCNC: 27 MMOL/L (ref 21–32)
COLOR UR: ABNORMAL
CREAT SERPL-MCNC: 1.29 MG/DL (ref 0.7–1.3)
EOSINOPHIL # BLD: 0 K/UL (ref 0–0.4)
EOSINOPHIL NFR BLD: 0 % (ref 0–7)
EPITH CASTS URNS QL MICRO: NORMAL /LPF
ERYTHROCYTE [DISTWIDTH] IN BLOOD BY AUTOMATED COUNT: 13.8 % (ref 11.5–14.5)
GAMMA GLOB SERPL ELPH-MCNC: 0.7 G/DL (ref 0.4–1.8)
GLOBULIN SER CALC-MCNC: 2 G/DL (ref 2.2–3.9)
GLUCOSE SERPL-MCNC: 91 MG/DL (ref 65–100)
GLUCOSE UR STRIP.AUTO-MCNC: 100 MG/DL
HCT VFR BLD AUTO: 29.8 % (ref 36.6–50.3)
HGB BLD-MCNC: 10.4 G/DL (ref 12.1–17)
HGB UR QL STRIP: ABNORMAL
KETONES UR QL STRIP.AUTO: NEGATIVE MG/DL
LEUKOCYTE ESTERASE UR QL STRIP.AUTO: NEGATIVE
LYMPHOCYTES # BLD: 0.6 K/UL (ref 0.8–3.5)
LYMPHOCYTES NFR BLD: 10 % (ref 12–49)
M PROTEIN SERPL ELPH-MCNC: ABNORMAL G/DL
MCH RBC QN AUTO: 30.7 PG (ref 26–34)
MCHC RBC AUTO-ENTMCNC: 34.9 G/DL (ref 30–36.5)
MCV RBC AUTO: 87.9 FL (ref 80–99)
MONOCYTES # BLD: 0.3 K/UL (ref 0–1)
MONOCYTES NFR BLD: 5 % (ref 5–13)
NEUTS SEG # BLD: 5.2 K/UL (ref 1.8–8)
NEUTS SEG NFR BLD: 85 % (ref 32–75)
NITRITE UR QL STRIP.AUTO: NEGATIVE
PH UR STRIP: 6 [PH] (ref 5–8)
PHOSPHATE SERPL-MCNC: 1.5 MG/DL (ref 2.6–4.7)
PLATELET # BLD AUTO: 102 K/UL (ref 150–400)
POTASSIUM SERPL-SCNC: 3.3 MMOL/L (ref 3.5–5.1)
PROT SERPL-MCNC: 4.8 G/DL (ref 6–8.5)
PROT UR STRIP-MCNC: 30 MG/DL
RBC # BLD AUTO: 3.39 M/UL (ref 4.1–5.7)
RBC #/AREA URNS HPF: NORMAL /HPF (ref 0–5)
SODIUM SERPL-SCNC: 134 MMOL/L (ref 136–145)
SP GR UR REFRACTOMETRY: 1.01 (ref 1–1.03)
TROPONIN I SERPL-MCNC: 0.65 NG/ML
UROBILINOGEN UR QL STRIP.AUTO: 0.2 EU/DL (ref 0.2–1)
WBC # BLD AUTO: 6.1 K/UL (ref 4.1–11.1)
WBC URNS QL MICRO: NORMAL /HPF (ref 0–4)

## 2017-12-21 PROCEDURE — 74011250636 HC RX REV CODE- 250/636: Performed by: INTERNAL MEDICINE

## 2017-12-21 PROCEDURE — 94640 AIRWAY INHALATION TREATMENT: CPT

## 2017-12-21 PROCEDURE — 80069 RENAL FUNCTION PANEL: CPT | Performed by: INTERNAL MEDICINE

## 2017-12-21 PROCEDURE — 74011250636 HC RX REV CODE- 250/636: Performed by: HOSPITALIST

## 2017-12-21 PROCEDURE — 82550 ASSAY OF CK (CPK): CPT | Performed by: HOSPITALIST

## 2017-12-21 PROCEDURE — 97161 PT EVAL LOW COMPLEX 20 MIN: CPT

## 2017-12-21 PROCEDURE — 71010 XR CHEST PORT: CPT

## 2017-12-21 PROCEDURE — 74011000250 HC RX REV CODE- 250: Performed by: HOSPITALIST

## 2017-12-21 PROCEDURE — 93005 ELECTROCARDIOGRAM TRACING: CPT

## 2017-12-21 PROCEDURE — 74011250637 HC RX REV CODE- 250/637: Performed by: INTERNAL MEDICINE

## 2017-12-21 PROCEDURE — 74011250637 HC RX REV CODE- 250/637: Performed by: HOSPITALIST

## 2017-12-21 PROCEDURE — 36415 COLL VENOUS BLD VENIPUNCTURE: CPT | Performed by: HOSPITALIST

## 2017-12-21 PROCEDURE — 87045 FECES CULTURE AEROBIC BACT: CPT | Performed by: HOSPITALIST

## 2017-12-21 PROCEDURE — 84484 ASSAY OF TROPONIN QUANT: CPT | Performed by: HOSPITALIST

## 2017-12-21 PROCEDURE — 87493 C DIFF AMPLIFIED PROBE: CPT | Performed by: HOSPITALIST

## 2017-12-21 PROCEDURE — 65660000000 HC RM CCU STEPDOWN

## 2017-12-21 PROCEDURE — 85025 COMPLETE CBC W/AUTO DIFF WBC: CPT | Performed by: HOSPITALIST

## 2017-12-21 PROCEDURE — 74011000250 HC RX REV CODE- 250: Performed by: INTERNAL MEDICINE

## 2017-12-21 PROCEDURE — 97165 OT EVAL LOW COMPLEX 30 MIN: CPT

## 2017-12-21 RX ORDER — POTASSIUM CHLORIDE 14.9 MG/ML
10 INJECTION INTRAVENOUS
Status: COMPLETED | OUTPATIENT
Start: 2017-12-21 | End: 2017-12-21

## 2017-12-21 RX ORDER — LEVOFLOXACIN 5 MG/ML
750 INJECTION, SOLUTION INTRAVENOUS
Status: DISCONTINUED | OUTPATIENT
Start: 2017-12-21 | End: 2017-12-23

## 2017-12-21 RX ORDER — ASPIRIN 325 MG
325 TABLET ORAL ONCE
Status: COMPLETED | OUTPATIENT
Start: 2017-12-21 | End: 2017-12-21

## 2017-12-21 RX ORDER — METOPROLOL TARTRATE 5 MG/5ML
5 INJECTION INTRAVENOUS ONCE
Status: COMPLETED | OUTPATIENT
Start: 2017-12-21 | End: 2017-12-21

## 2017-12-21 RX ORDER — LEVOFLOXACIN 5 MG/ML
750 INJECTION, SOLUTION INTRAVENOUS EVERY 24 HOURS
Status: DISCONTINUED | OUTPATIENT
Start: 2017-12-21 | End: 2017-12-21 | Stop reason: DRUGHIGH

## 2017-12-21 RX ORDER — METOPROLOL TARTRATE 25 MG/1
25 TABLET, FILM COATED ORAL EVERY 12 HOURS
Status: DISCONTINUED | OUTPATIENT
Start: 2017-12-21 | End: 2017-12-24 | Stop reason: HOSPADM

## 2017-12-21 RX ORDER — DILTIAZEM HYDROCHLORIDE 5 MG/ML
10 INJECTION INTRAVENOUS ONCE
Status: DISCONTINUED | OUTPATIENT
Start: 2017-12-21 | End: 2017-12-21

## 2017-12-21 RX ORDER — METRONIDAZOLE 500 MG/100ML
500 INJECTION, SOLUTION INTRAVENOUS EVERY 8 HOURS
Status: DISCONTINUED | OUTPATIENT
Start: 2017-12-21 | End: 2017-12-24 | Stop reason: HOSPADM

## 2017-12-21 RX ADMIN — Medication 10 ML: at 06:00

## 2017-12-21 RX ADMIN — ASPIRIN 325 MG: 325 TABLET ORAL at 19:57

## 2017-12-21 RX ADMIN — METRONIDAZOLE 500 MG: 500 INJECTION, SOLUTION INTRAVENOUS at 19:06

## 2017-12-21 RX ADMIN — LEVOFLOXACIN 750 MG: 5 INJECTION, SOLUTION INTRAVENOUS at 20:32

## 2017-12-21 RX ADMIN — AMLODIPINE BESYLATE 5 MG: 5 TABLET ORAL at 10:47

## 2017-12-21 RX ADMIN — ALBUTEROL SULFATE 5 MG: 2.5 SOLUTION RESPIRATORY (INHALATION) at 00:15

## 2017-12-21 RX ADMIN — PIPERACILLIN AND TAZOBACTAM 10.12 G: 3; .375 INJECTION, POWDER, FOR SOLUTION INTRAVENOUS at 13:49

## 2017-12-21 RX ADMIN — POTASSIUM CHLORIDE 10 MEQ: 200 INJECTION, SOLUTION INTRAVENOUS at 20:32

## 2017-12-21 RX ADMIN — POTASSIUM CHLORIDE 10 MEQ: 200 INJECTION, SOLUTION INTRAVENOUS at 19:06

## 2017-12-21 RX ADMIN — METOPROLOL TARTRATE 5 MG: 1 INJECTION, SOLUTION INTRAVENOUS at 17:41

## 2017-12-21 RX ADMIN — METOPROLOL TARTRATE 25 MG: 25 TABLET ORAL at 21:16

## 2017-12-21 RX ADMIN — Medication 10 ML: at 13:50

## 2017-12-21 RX ADMIN — ACETAMINOPHEN 650 MG: 325 TABLET, FILM COATED ORAL at 14:08

## 2017-12-21 RX ADMIN — SODIUM CHLORIDE, SODIUM LACTATE, POTASSIUM CHLORIDE, AND CALCIUM CHLORIDE 75 ML/HR: 600; 310; 30; 20 INJECTION, SOLUTION INTRAVENOUS at 10:47

## 2017-12-21 NOTE — PROGRESS NOTES
Hospitalist Progress Note  Elvia Melendez MD  Answering service: [phone] or [phone] from in house phone      Date of Service:  2017  NAME:  Lizzeth Thomas  :  1955  MRN:  [id number]      Admission Summary:    A 59-year-old -American male with past medical history of gout and hypertension presented to the Emergency Department from home accompanied by a sister and brother with chief complaint of abdominal pain. Interval history / Subjective:     Patient seen in followup up of abdominal pain,he is denying any pains. He reports that he has'nt moved his bowels yet.       Assessment & Plan:     S/P shock from hypovolemia and hypotension  Improved with IVF and Holding PTA bp meds    Abnormal CT scan on admission with possible acute ischemic bowel (on ct scan on admission)  S/p ex lap - unremarkable with normal bowel - possible recovery of ischemic bowel   Continue following for resolution of bowel function  Surgery following  Diet as tolerated  C diff pending    Diarrhea on admission  R/O sepsis   Follow up cultures - so far negative    Lactic acidosis  From shock- reolved    ARCADIO on admission with metabolic acidosis  Possible prerenal in nature versus ATN from Hypotension   Off bicarb as acidosis resolved  Improving with hydration  Renal dose medications  Hold ACE/HCTZ  Renal US negative    Bradycardia  Likely vasovagal/Abdominal pain related  Resolved - tele showing nsr  Followup Echo - Normal EF    Etoh Abuse  On CIWA protocol    Hyperglycemia  Check A1c  Check Finsgersticks    Cachexia -- underweight on admission  Follow up HIV  Hepatitis panel neg     - had ex lap for diagnosis of ischemic bowel   Code status: Full  DVT prophylaxis: SCD    Care Plan discussed with: Patient/Family, Nurse and   Disposition: TBD   Continue tele monitoring as he is having fevers     Hospital Problems  Date Reviewed: 2017 Codes Class Noted POA    Lactic acidosis ICD-10-CM: E87.2  ICD-9-CM: 276.2  12/18/2017 Yes        * (Principal)Shock ([address] ICD-10-CM: R57.9  ICD-9-CM: 785.50  12/18/2017 Yes        Syncope ICD-10-CM: R55  ICD-9-CM: 780.2  12/18/2017 Yes        ARF (acute renal failure) (HCC) ICD-10-CM: N17.9  ICD-9-CM: 584.9  12/18/2017 Yes        Symptomatic bradycardia ICD-10-CM: R00.1  ICD-9-CM: 427.89  12/18/2017 Yes        Pneumatosis intestinalis ICD-10-CM: J98.93  ICD-9-CM: 569.89  12/18/2017 Yes                Review of Systems:   A comprehensive review of systems was negative except for that written in the HPI. Vital Signs:    Last 24hrs VS reviewed since prior progress note. Most recent are:  Visit Vitals    /78 (BP 1 Location: Left arm, BP Patient Position: At rest)    Pulse 87    Temp 99.5 °F (37.5 °C)    Resp 18    Ht 5' 2\" (1.575 m)    Wt 46.4 kg (102 lb 5 oz)    SpO2 92%    BMI 18.71 kg/m2         Intake/Output Summary (Last 24 hours) at 12/21/17 9415  Last data filed at 12/21/17 0257   Gross per 24 hour   Intake              240 ml   Output                0 ml   Net              240 ml        Physical Examination:             Constitutional:  No acute distress, cooperative, pleasant    ENT:  Oral mucous moist, oropharynx benign. Neck supple,    Resp:  CTA bilaterally. No wheezing/rhonchi/rales. No accessory muscle use   CV:  Regular rhythm, normal rate, no murmurs, gallops, rubs    GI:  Soft, non distended, Midline Abd incision in place, no dehiscence. non tender. normoactive bowel sounds, no hepatosplenomegaly     Musculoskeletal:  No edema, warm, 2+ pulses throughout    Neurologic:  Moves all extremities. AAOx3, CN II-XII reviewed     Psych:  Good insight, Not anxious nor agitated.        Data Review:    Review and/or order of clinical lab test  Review and/or order of tests in the radiology section of CPT  Decision to obtain old records and/or obtain history from someone other than the patient      Labs:     Recent Labs      12/21/17   0547  12/20/17   0505   WBC  6.1  7.8   HGB  10.4*  9.7*   HCT  29.8*  28.3*   PLT  102*  121*     Recent Labs      12/20/17   0505  12/19/17 0443  12/18/17 1917   NA  137  140  130*   K  3.7  4.7  4.3   CL  106  112*  97   CO2  24  16*  19*   BUN  24*  38*  45*   CREA  1.95*  3.00*  5.01*   GLU  110*  125*  219*   CA  7.3*  7.2*  9.1   MG   --   1.5*  1.8   PHOS   --   4.8*  7.3*     Recent Labs      12/19/17   0443  12/18/17 1917   SGOT  28  32   ALT  16  24   AP  53  87   TBILI  0.2  0.3   TP  5.9*  8.2   ALB  2.9*  4.1   GLOB  3.0  4.1*     No results for input(s): INR, PTP, APTT in the last 72 hours. No lab exists for component: INREXT, INREXT   No results for input(s): FE, TIBC, PSAT, FERR in the last 72 hours. No results found for: FOL, RBCF   No results for input(s): PH, PCO2, PO2 in the last 72 hours. Recent Labs      12/18/17 1917   CPK  370*   CKNDX  Cannot be calculated   TROIQ  <0.04     No results found for: CHOL, CHOLX, CHLST, CHOLV, HDL, LDL, LDLC, DLDLP, TGLX, TRIGL, TRIGP, CHHD, CHHDX  No results found for: [facility]  Lab Results   Component Value Date/Time    Color YELLOW/STRAW 12/20/2017 11:31 PM    Appearance CLEAR 12/20/2017 11:31 PM    Specific gravity 1.015 12/20/2017 11:31 PM    Specific gravity 1.012 12/18/2017 08:31 PM    pH (UA) 6.0 12/20/2017 11:31 PM    Protein 30 12/20/2017 11:31 PM    Glucose 100 12/20/2017 11:31 PM    Ketone NEGATIVE  12/20/2017 11:31 PM    Bilirubin NEGATIVE  12/20/2017 11:31 PM    Urobilinogen 0.2 12/20/2017 11:31 PM    Nitrites NEGATIVE  12/20/2017 11:31 PM    Leukocyte Esterase NEGATIVE  12/20/2017 11:31 PM    Epithelial cells FEW 12/20/2017 11:31 PM    Bacteria NEGATIVE  12/20/2017 11:31 PM    WBC 0-4 12/20/2017 11:31 PM    RBC 5-10 12/20/2017 11:31 PM     ECHO  Left ventricle: Systolic function was normal. Ejection fraction was  estimated in the range of 55 % to 60 %.  There were no regional wall motion  abnormalities. Medications Reviewed:     Current Facility-Administered Medications   Medication Dose Route Frequency    lactated Ringers infusion  75 mL/hr IntraVENous CONTINUOUS    piperacillin-tazobactam (ZOSYN) 10.125 g in  mL infusion  10.125 g IntraVENous Q24H    amLODIPine (NORVASC) tablet 5 mg  5 mg Oral DAILY    acetaminophen (TYLENOL) tablet 650 mg  650 mg Oral Q6H PRN    albuterol (PROVENTIL VENTOLIN) nebulizer solution 5 mg  5 mg Nebulization Q6H PRN    sodium chloride (NS) flush 5-10 mL  5-10 mL IntraVENous Q8H    sodium chloride (NS) flush 5-10 mL  5-10 mL IntraVENous PRN    morphine injection 2 mg  2 mg IntraVENous Q2H PRN    0.9% sodium chloride 1,000 mL with mvi, adult no. 4 with vit K 10 mL, thiamine 867 mg, folic acid 1 mg infusion   IntraVENous Q24H    LORazepam (ATIVAN) injection 1 mg  1 mg IntraVENous Q4H PRN    LORazepam (ATIVAN) injection 2 mg  2 mg IntraVENous Q6H PRN     ______________________________________________________________________  EXPECTED LENGTH OF STAY: 3d 16h  ACTUAL LENGTH OF STAY:          3                 Dequan Dickey MD

## 2017-12-21 NOTE — PROGRESS NOTES
Received call from telemetry, patient's HR was elevated in the 170s. Patient ambulating in room. Notified MD; MD to call telemetry tech.

## 2017-12-21 NOTE — PROGRESS NOTES
Problem: Mobility Impaired (Adult and Pediatric)  Goal: *Acute Goals and Plan of Care (Insert Text)  Physical Therapy Goals  Initiated 12/21/2017  1. Patient will perform sit to stand with independence within 7 day(s). 2.  Patient will ambulate with supervision/set-up for 150 feet with the least restrictive device within 7 day(s). physical Therapy EVALUATION  Patient: Jayden Rosas (6 y.o. male)  Date: 12/21/2017  Primary Diagnosis: Shock ([address]  ARF (acute renal failure) (HCC)  Symptomatic bradycardia  Lactic acidosis  Syncope  UNKNOWN  Procedure(s) (LRB):  LAPAROTOMY EXPLORATORY (N/A) 3 Days Post-Op   Precautions:  Fall, Contact    ASSESSMENT :  Based on the objective data described below, the patient presents with generally decreased strength, balance, and endurance with loose stool and incontinence limiting his functional mobility from a safe and independent baseline status. Pt reports he is normally living with his sister and mother (elderly) and he works mowing lawns. Pt up ad deniz upon arrival this morning with soiled gown, socks, and linens. Pt assisted with being cleaned and changed. Pt not appropriate for further participation/gait training at this time 2* above complications. Anticipate return home with family once medically stable. No skilled PT needs likely indicated. Patient will benefit from skilled intervention to address the above impairments.   Patients rehabilitation potential is considered to be Fair  Factors which may influence rehabilitation potential include:   []         None noted  []         Mental ability/status  [x]         Medical condition  []         Home/family situation and support systems  []         Safety awareness  []         Pain tolerance/management  []         Other:      PLAN :  Recommendations and Planned Interventions:  []           Bed Mobility Training             []    Neuromuscular Re-Education  []           Transfer Training                   [] Orthotic/Prosthetic Training  [x]           Gait Training                         []    Modalities  [x]           Therapeutic Exercises           []    Edema Management/Control  [x]           Therapeutic Activities            [x]    Patient and Family Training/Education  []           Other (comment):    Frequency/Duration: Patient will be followed by physical therapy  3 times a week to address goals. Discharge Recommendations: None  Further Equipment Recommendations for Discharge: None     SUBJECTIVE:   Patient stated Jovanny Hand is shit everywhere. .. I am so tired of getting up. ..    OBJECTIVE DATA SUMMARY:   HISTORY:    Past Medical History:   Diagnosis Date    Gout     Hypertension    History reviewed. No pertinent surgical history. Prior Level of Function/Home Situation: sister helps with driving/grocery shopping needs; both care for mother; pt mows lawns for income  Personal factors and/or comorbidities impacting plan of care: supportive sister and brother    Home Situation  Home Environment: Private residence  # Steps to Enter: 0  One/Two Story Residence: One story  Living Alone: No  Support Systems: Family member(s)  Patient Expects to be Discharged to[de-identified] Private residence  Current DME Used/Available at Home: None    EXAMINATION/PRESENTATION/DECISION MAKING:   Critical Behavior:  Neurologic State: Alert  Orientation Level: Oriented X4  Cognition: Follows commands  Safety/Judgement: Fall prevention  Hearing:   Auditory  Auditory Impairment: None  Range Of Motion:  AROM: Generally decreased, functional                       Strength:    Strength: Generally decreased, functional                    Tone & Sensation:   Tone: Normal              Sensation:  (NT)               Coordination:  Coordination: Within functional limits  Functional Mobility:  Bed Mobility:  Rolling: Independent  Supine to Sit: Independent  Sit to Supine: Independent  Scooting: Independent  Transfers:  Sit to Stand: Supervision  Stand to Sit: Supervision                       Balance:   Sitting: Intact  Standing: Intact  Ambulation/Gait Training:   Pt taking multiple steady steps in room from [facility] to EOB, back, and then back to bed with supervision; pt peformed his own pericare though assist provided for gown and brief donning 2* lines and leads  Functional Measure:  Barthel Index:    Bathin  Bladder: 5  Bowels: 0  Groomin  Dressin  Feeding: 10  Mobility: 0  Stairs: 0  Toilet Use: 5  Transfer (Bed to Chair and Back): 10  Total: 40       Barthel and G-code impairment scale:  Percentage of impairment CH  0% CI  1-19% CJ  20-39% CK  40-59% CL  60-79% CM  80-99% CN  100%   Barthel Score 0-100 100 99-80 79-60 59-40 20-39 1-19   0   Barthel Score 0-20 20 17-19 13-16 9-12 5-8 1-4 0      The Barthel ADL Index: Guidelines  1. The index should be used as a record of what a patient does, not as a record of what a patient could do. 2. The main aim is to establish degree of independence from any help, physical or verbal, however minor and for whatever reason. 3. The need for supervision renders the patient not independent. 4. A patient's performance should be established using the best available evidence. Asking the patient, friends/relatives and nurses are the usual sources, but direct observation and common sense are also important. However direct testing is not needed. 5. Usually the patient's performance over the preceding 24-48 hours is important, but occasionally longer periods will be relevant. 6. Middle categories imply that the patient supplies over 50 per cent of the effort. 7. Use of aids to be independent is allowed. Emily Silva., Barthel, DIsraelW. (1706). Functional evaluation: the Barthel Index. [facility] (14)2. Elías Caceres jennifer RAJEEV García, Ora Stone., Chas Piper., Tyler Moore, [facility] ().  Measuring the change indisability after inpatient rehabilitation; comparison of the responsiveness of the Barthel Index and Functional [place] Measure. Journal of Neurology, Neurosurgery, and Psychiatry, 66(4), 477-083. LAURO Weinstein, INDU Suarez, & Master Ríos M.A. (2004.) Assessment of post-stroke quality of life in cost-effectiveness studies: The usefulness of the Barthel Index and the EuroQoL-5D. Quality of Life Research, 13, 123-93         G codes: In compliance with CMSs Claims Based Outcome Reporting, the following G-code set was chosen for this patient based on their primary functional limitation being treated: The outcome measure chosen to determine the severity of the functional limitation was the Barthel with a score of 40/100 which was correlated with the impairment scale. ? Mobility - Walking and Moving Around:     - CURRENT STATUS: CK - 40%-59% impaired, limited or restricted    - GOAL STATUS: CJ - 20%-39% impaired, limited or restricted    - D/C STATUS:  ---------------To be determined---------------      Pain:  Pain Scale 1: Numeric (0 - 10)  Pain Intensity 1: 0              Activity Tolerance:   NAD  After treatment:   []         Patient left in no apparent distress sitting up in chair  [x]         Patient left in no apparent distress in bed  [x]         Call bell left within reach  [x]         Nursing notified  []         Caregiver present  []         Bed alarm activated    COMMUNICATION/EDUCATION:   The patients plan of care was discussed with: Registered Nurse. [x]         Fall prevention education was provided and the patient/caregiver indicated understanding. [x]         Patient/family have participated as able in goal setting and plan of care. [x]         Patient/family agree to work toward stated goals and plan of care. []         Patient understands intent and goals of therapy, but is neutral about his/her participation. []         Patient is unable to participate in goal setting and plan of care.     Thank you for this referral.  Ilda Blackmon   Time Calculation: 15 mins

## 2017-12-21 NOTE — PROGRESS NOTES
Noted borderline elevated trop. Ekg reviewed - Repolarization abnormalities, No ST elevations. No chest pain reported by patient,. Transfer to [facility], asa now, added BB po. Continue to monitor and check two more sets of trops. D/W Rn and patient.

## 2017-12-21 NOTE — PROGRESS NOTES
Patient reports having stools throughout the night. He was unable to remember to save stool with adding urine and toilet paper for stool samples. Is not able to fully comprehend how to use incentive spirometer properly. He hyperventilates with it. Found several times tangled up in subclavian in tubing  and O2 tubing wrapped around body from rolling around in bed.

## 2017-12-21 NOTE — PROGRESS NOTES
Occupational Therapy EVALUATION/discharge  Patient: Marcus ePrez (26 y.o. male)  Date: 12/21/2017  Primary Diagnosis: Shock ([address]  ARF (acute renal failure) (HCC)  Symptomatic bradycardia  Lactic acidosis  Syncope  UNKNOWN  Procedure(s) (LRB):  LAPAROTOMY EXPLORATORY (N/A) 3 Days Post-Op   Precautions:  Fall, Contact    ASSESSMENT:   Cleared by RN to see pt for therapy session. Based on the objective data described below, the patient presents with mildly decreased balance and endurance following admission for ARF and shock, near baseline level of function. Pt lives with his mother and sister, previously I with ADLs and mobility, works [employer] and enjoys riding his bike. Pt received supine in bed, agreeable to participate. Bed mobility performed with independence. In sitting pt performed LB dressing ADL with independence, no difficulty reaching LEs. Performed functional transfers with modified independence during session, somewhat slow gait speed and mildly unsteady but no LOB. Toilet transfer performed to simulate home environment and pt performed with no need for assistance. UE ROM and strength WFL as evidenced by grooming and feeding ADLs. Returned to bed at end of session. Educated pt on energy conservation techniques to practice while at home, and he verbalized understanding. No questions or concerns at end of session. Pt is performing near baseline at this time. Further skilled acute occupational therapy is not indicated at this time. Discharge Recommendations: None  Further Equipment Recommendations for Discharge: none      SUBJECTIVE:   Patient stated I'm feeling a lot better now than when I first got here.     OBJECTIVE DATA SUMMARY:   HISTORY:   Past Medical History:   Diagnosis Date    Gout     Hypertension    History reviewed. No pertinent surgical history.     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 Home Situation  Home Environment: Private residence  # Steps to Enter: 0  One/Two Story Residence: One story  Living Alone: No (lives with mother and sister)  Support Systems: Family member(s)  Patient Expects to be Discharged to[de-identified] Private residence  Current DME Used/Available at Home: None  Tub or Shower Type: Tub/Shower combination  [x]  Right hand dominant   []  Left hand dominant    EXAMINATION OF PERFORMANCE DEFICITS:  Cognitive/Behavioral Status:  Neurologic State: Alert  Orientation Level: Oriented X4  Cognition: Follows commands  Perception: Appears intact  Perseveration: No perseveration noted  Safety/Judgement: Awareness of environment; Fall prevention;Home safety    Hearing: Auditory  Auditory Impairment: None    Vision/Perceptual:       Acuity: Able to read clock/calendar on wall without difficulty         Range of Motion:  AROM: Within functional limits  PROM: Within functional limits        Strength:  Strength: Generally decreased, functional     Coordination:  Coordination: Within functional limits  Fine Motor Skills-Upper: Left Intact; Right Intact    Gross Motor Skills-Upper: Left Intact; Right Intact    Tone & Sensation:  Tone: Normal  Sensation: Intact          Balance:  Sitting: Intact  Standing: Intact    Functional Mobility and Transfers for ADLs:  Bed Mobility:  Rolling: Independent  Supine to Sit: Independent  Sit to Supine: Independent  Scooting: Independent    Transfers:  Sit to Stand: Modified independent  Stand to Sit: Modified independent  Toilet Transfer : Modified independent    ADL Assessment:  Feeding: Independent    Oral Facial Hygiene/Grooming: Independent    Bathing: Supervision    Upper Body Dressing: Independent    Lower Body Dressing: Modified independent    Toileting: Supervision       ADL Intervention and task modifications: Toilet transfer performed to simulate home environment and pt performed with no need for assistance.  UE ROM and strength WFL as evidenced by grooming and feeding ADLs. Educated pt on energy conservation techniques to practice while at home (taking purposeful rest breaks, sitting to complete tasks, pacing), and he verbalized understanding. Feeding  Feeding Assistance: Independent    Grooming  Grooming Assistance: Independent       Lower Body Dressing Assistance  Socks: Independent         Cognitive Retraining  Safety/Judgement: Awareness of environment; Fall prevention;Home safety    Functional Measure:  Barthel Index:    Bathin  Bladder: 5  Bowels: 5  Groomin  Dressing: 10  Feeding: 10  Mobility: 5  Stairs: 5  Toilet Use: 5  Transfer (Bed to Chair and Back): 10  Total: 60       Barthel and G-code impairment scale:  Percentage of impairment CH  0% CI  1-19% CJ  20-39% CK  40-59% CL  60-79% CM  80-99% CN  100%   Barthel Score 0-100 100 99-80 79-60 59-40 20-39 1-19   0   Barthel Score 0-20 20 17-19 13-16 9-12 5-8 1-4 0      The Barthel ADL Index: Guidelines  1. The index should be used as a record of what a patient does, not as a record of what a patient could do. 2. The main aim is to establish degree of independence from any help, physical or verbal, however minor and for whatever reason. 3. The need for supervision renders the patient not independent. 4. A patient's performance should be established using the best available evidence. Asking the patient, friends/relatives and nurses are the usual sources, but direct observation and common sense are also important. However direct testing is not needed. 5. Usually the patient's performance over the preceding 24-48 hours is important, but occasionally longer periods will be relevant. 6. Middle categories imply that the patient supplies over 50 per cent of the effort. 7. Use of aids to be independent is allowed. Guera Mead., Barthel, D.W. (2215). Functional evaluation: the Barthel Index. [address] [facility] (14)2.   Ara Ban jennifer [place], J.J.M.F, Samuel Liu, Ratna Navarrete, Phillip Hunter (1999). Measuring the change indisability after inpatient rehabilitation; comparison of the responsiveness of the Barthel Index and Functional [place] Measure. Journal of Neurology, Neurosurgery, and Psychiatry, 66(4), 482-425. LAURO Kulkarni, INDU Suarez, & Antwan Layton M.A. (2004.) Assessment of post-stroke quality of life in cost-effectiveness studies: The usefulness of the Barthel Index and the EuroQoL-5D. Quality of Life Research, 13, 896-17       G codes: In compliance with CMSs Claims Based Outcome Reporting, the following G-code set was chosen for this patient based on their primary functional limitation being treated: The outcome measure chosen to determine the severity of the functional limitation was the Barthel Index with a score of 60/100 which was correlated with the impairment scale. ? Self Care:     - CURRENT STATUS: CK - 40%-59% impaired, limited or restricted    - GOAL STATUS: CK - 40%-59% impaired, limited or restricted    - D/C STATUS:  CK - 40%-59% impaired, limited or restricted     Occupational Therapy Evaluation Charge Determination   History Examination Decision-Making   LOW Complexity : Brief history review  LOW Complexity : 1-3 performance deficits relating to physical, cognitive , or psychosocial skils that result in activity limitations and / or participation restrictions  LOW Complexity : No comorbidities that affect functional and no verbal or physical assistance needed to complete eval tasks       Based on the above components, the patient evaluation is determined to be of the following complexity level: LOW   Pain:  Pain Scale 1: Numeric (0 - 10)  Pain Intensity 1: 0              Activity Tolerance:   Good  Please refer to the flowsheet for vital signs taken during this treatment.   After treatment:   []  Patient left in no apparent distress sitting up in chair  [x]  Patient left in no apparent distress in bed  [x] Call bell left within reach  [x]  Nursing notified  []  Caregiver present  []  Bed alarm activated    COMMUNICATION/EDUCATION:   Communication/Collaboration:  [x]      Home safety education was provided and the patient/caregiver indicated understanding. [x]      Patient/family have participated as able and agree with findings and recommendations. []      Patient is unable to participate in plan of care at this time.   Findings and recommendations were discussed with: Registered Nurse    Noemy Zavala OT  Time Calculation: 16 mins

## 2017-12-21 NOTE — PROGRESS NOTES
Problem: Falls - Risk of  Goal: *Absence of Falls  Document Scott Fall Risk and appropriate interventions in the flowsheet.    Outcome: Progressing Towards Goal  Fall Risk Interventions:  Mobility Interventions: Communicate number of staff needed for ambulation/transfer, OT consult for ADLs, Patient to call before getting OOB, PT Consult for mobility concerns         Medication Interventions: Evaluate medications/consider consulting pharmacy, Patient to call before getting OOB, Teach patient to arise slowly         History of Falls Interventions: Room close to nurse's station, Consult care management for discharge planning, Door open when patient unattended, Evaluate medications/consider consulting pharmacy

## 2017-12-21 NOTE — PROGRESS NOTES
TRANSFER - OUT REPORT:    Verbal report given to Zandra Zapata RN(name) on Gabrielle Rinaldi  being transferred to Room 203(unit) for routine progression of care       Report consisted of patients Situation, Background, Assessment and   Recommendations(SBAR). Information from the following report(s) SBAR, Intake/Output, MAR, Accordion and Cardiac Rhythm NSR was reviewed with the receiving nurse. Lines:   Triple Lumen 12/19/17 Right Neck (Active)   Central Line Being Utilized Yes 12/20/2017  4:17 AM   Criteria for Appropriate Use Hemodynamically unstable, requiring monitoring lines, vasopressors, or volume resuscitation 12/20/2017  4:17 AM   Site Assessment Clean, dry, & intact 12/20/2017  4:17 AM   Infiltration Assessment 0 12/20/2017  4:17 AM   Affected Extremity/Extremities Color distal to insertion site pink (or appropriate for race) 12/20/2017  4:17 AM   Date of Last Dressing Change 12/18/17 12/20/2017  4:17 AM   Dressing Status Clean, dry, & intact 12/20/2017  4:17 AM   Dressing Type Disk with Chlorhexadine gluconate (CHG); Transparent 12/20/2017  4:17 AM   Action Taken Open ports on tubing capped 12/20/2017  4:17 AM   Proximal Hub Color/Line Status White; Infusing 12/20/2017  4:17 AM   Positive Blood Return (Medial Site) Yes 12/20/2017  4:17 AM   Medial Hub Color/Line Status Blue;Capped 12/20/2017  4:17 AM   Positive Blood Return (Lateral Site) Yes 12/20/2017  4:17 AM   Distal Hub Color/Line Status Jackquelyn Capuchin; Infusing 12/20/2017  4:17 AM   Positive Blood Return (Site #3) Yes 12/20/2017  4:17 AM   Alcohol Cap Used Yes 12/20/2017  4:17 AM        Opportunity for questions and clarification was provided.       Patient transported with:   [employer]

## 2017-12-21 NOTE — PROGRESS NOTES
Bedside shift change report given to [address] [place] [address] Avenue (oncoming nurse) by Travis Nelson RN (offgoing nurse). Report included the following information SBAR, Kardex, Intake/Output, MAR and Recent Results.

## 2017-12-21 NOTE — PROGRESS NOTES
Primary Nurse Kristina Barrera RN and Josephine Nguyen RN performed a dual skin assessment on this patient No impairment noted. Patient has midline abdominal incision from 12/18 exploratory lap; dressing is dry and intact, has old drainage (marked).   Jesus score is 18

## 2017-12-21 NOTE — PROGRESS NOTES
Discussed patient with Dr. Domenica Camacho. Patient admitted with poor appetite and weakness. Patient lives with his mother and sister. PT is stating patient does not need HH/PT. Patient has a fever this morning so not ready for discharge yet.

## 2017-12-21 NOTE — PROGRESS NOTES
Day #1 of Levofloxacin   Indication:  Intra-abdominal infection   Current regimen:  750 mg q24h  Abx regimen: Levaquin+Flagyl  Recent Labs      17   1050  17   0547  17   0505  17   0443   WBC   --   6.1  7.8  7.6   CREA  1.29   --   1.95*  3.00*   BUN  12   --   24*  38*     Est CrCl: ~40 ml/min;    Temp (24hrs), Av °F (38.3 °C), Min:99.5 °F (37.5 °C), Max:103.1 °F (39.5 °C)    Cultures:  Blood-NGTD                   Stool-+ Campylobacter Ag                    C.diff-pending       Plan: Change to 750 mg q48h per protocol for current renal fxn      Jacey Wiggins, PharmD

## 2017-12-21 NOTE — PROGRESS NOTES
12/20/17 2233   Vital Signs   Temp (!) 103.1 °F (39.5 °C)   Temp Source Oral   Pulse (Heart Rate) 99   Heart Rate Source Monitor   Resp Rate 18   O2 Sat (%) (!) 85 %   Level of Consciousness Alert   BP (!) 150/91   MAP (Calculated) 111   BP 1 Method Automatic   BP 1 Location Right arm   BP Patient Position At rest   MEWS Score 3   Oxygen Therapy   O2 Device Room air   Vidya Rodriguez notified.  New orders tylenol 650 mg every 6 hours fevers prn, chest xray, UA, ISS, paired blood cultures, albuterol every 6 hours dyspnea wheezing prn

## 2017-12-21 NOTE — PROGRESS NOTES
12/21/17 1355   Vital Signs   Temp (!) 102.8 °F (39.3 °C)   Temp Source Oral   Pulse (Heart Rate) 92   Heart Rate Source Monitor   Resp Rate 18   O2 Sat (%) 93 %   Level of Consciousness Alert   /83   MAP (Calculated) 108   BP 1 Method Automatic   MEWS Score 3    MEWS of 3 due to elevated temperature. Administered tylenol, will reassess in an hour.

## 2017-12-21 NOTE — PROGRESS NOTES
Pt had a brief run of svt on the tele monitor. ekg now and check Cardiac enzymes, patient asymtomatic. Continue to monitor on tele. One time lopressor and monitor.

## 2017-12-21 NOTE — PROGRESS NOTES
General Surgery Daily Progress Note    Admit Date: 2017  Post-Operative Day: 3 Days Post-Op from Procedure(s):  LAPAROTOMY EXPLORATORY     Subjective:     Last 24 hrs: No complaints this morning. Denies abdominal pain, nausea or vomiting. Has only been drinking water--does not \"have a taste\" for the clear liquids but willing to try cream of wheat. Denies fevers or chills. Labs stable & improving  No CP or SOB  Has been up in room. Objective:     Blood pressure 143/78, pulse 87, temperature 99.5 °F (37.5 °C), resp. rate 18, height 5' 2\" (1.575 m), weight 102 lb 5 oz (46.4 kg), SpO2 92 %. Temp (24hrs), Av.4 °F (38 °C), Min:98.9 °F (37.2 °C), Max:103.1 °F (39.5 °C)      _____________________  Physical Exam:     Alert and Oriented, cooperative, in no acute distress. Cardiovascular: RRR, no peripheral edema  Lungs:CTAB   Abdomen: soft, +BS. Appro TTP along staple line. Incision c/d/i with soiled overlying dressing. No rebound tenderness or guarding. Assessment:   Principal Problem:    Shock ([address] (2017)    Active Problems:    Lactic acidosis (2017)      Syncope (2017)      ARF (acute renal failure) ([address] (2017)      Symptomatic bradycardia (2017)      Pneumatosis intestinalis (2017)            Plan:     Advance to full liquids & assess tolerablilty. Encourage OOB  IV zosyn.   Medical management for Medicine Team.    Data Review:    Recent Labs      17   0547  17   0505  17   0443   WBC  6.1  7.8  7.6   HGB  10.4*  9.7*  10.1*   HCT  29.8*  28.3*  29.6*   PLT  102*  121*  116*     Recent Labs      17   0505  17   0443  17   1917   NA  137  140  130*   K  3.7  4.7  4.3   CL  106  112*  97   CO2  24  16*  19*   GLU  110*  125*  219*   BUN  24*  38*  45*   CREA  1.95*  3.00*  5.01*   CA  7.3*  7.2*  9.1   MG   --   1.5*  1.8   PHOS   --   4.8*  7.3*   ALB   --   2.9*  4.1   SGOT   --   28  32   ALT   --   16  24     No results for input(s): AML, LPSE in the last 72 hours. ______________________  Medications:    Current Facility-Administered Medications   Medication Dose Route Frequency    lactated Ringers infusion  75 mL/hr IntraVENous CONTINUOUS    piperacillin-tazobactam (ZOSYN) 10.125 g in  mL infusion  10.125 g IntraVENous Q24H    amLODIPine (NORVASC) tablet 5 mg  5 mg Oral DAILY    acetaminophen (TYLENOL) tablet 650 mg  650 mg Oral Q6H PRN    albuterol (PROVENTIL VENTOLIN) nebulizer solution 5 mg  5 mg Nebulization Q6H PRN    sodium chloride (NS) flush 5-10 mL  5-10 mL IntraVENous Q8H    sodium chloride (NS) flush 5-10 mL  5-10 mL IntraVENous PRN    morphine injection 2 mg  2 mg IntraVENous Q2H PRN    0.9% sodium chloride 1,000 mL with mvi, adult no. 4 with vit K 10 mL, thiamine 202 mg, folic acid 1 mg infusion   IntraVENous Q24H    LORazepam (ATIVAN) injection 1 mg  1 mg IntraVENous Q4H PRN    LORazepam (ATIVAN) injection 2 mg  2 mg IntraVENous Q6H PRN       David Levine PA-C  12/21/2017

## 2017-12-22 LAB
ATRIAL RATE: 86 BPM
CALCULATED P AXIS, ECG09: 64 DEGREES
CALCULATED R AXIS, ECG10: 54 DEGREES
CALCULATED T AXIS, ECG11: 58 DEGREES
DIAGNOSIS, 93000: NORMAL
P-R INTERVAL, ECG05: 136 MS
Q-T INTERVAL, ECG07: 358 MS
QRS DURATION, ECG06: 74 MS
QTC CALCULATION (BEZET), ECG08: 428 MS
T4 FREE SERPL-MCNC: 1.2 NG/DL (ref 0.8–1.5)
TROPONIN I SERPL-MCNC: 0.31 NG/ML
TSH SERPL DL<=0.05 MIU/L-ACNC: 0.96 UIU/ML (ref 0.36–3.74)
VENTRICULAR RATE, ECG03: 86 BPM

## 2017-12-22 PROCEDURE — 74011250636 HC RX REV CODE- 250/636: Performed by: FAMILY MEDICINE

## 2017-12-22 PROCEDURE — 84443 ASSAY THYROID STIM HORMONE: CPT | Performed by: HOSPITALIST

## 2017-12-22 PROCEDURE — 84484 ASSAY OF TROPONIN QUANT: CPT | Performed by: HOSPITALIST

## 2017-12-22 PROCEDURE — 74011000250 HC RX REV CODE- 250: Performed by: FAMILY MEDICINE

## 2017-12-22 PROCEDURE — 74011250636 HC RX REV CODE- 250/636: Performed by: HOSPITALIST

## 2017-12-22 PROCEDURE — 74011250637 HC RX REV CODE- 250/637: Performed by: HOSPITALIST

## 2017-12-22 PROCEDURE — 77010033678 HC OXYGEN DAILY

## 2017-12-22 PROCEDURE — 65270000032 HC RM SEMIPRIVATE

## 2017-12-22 PROCEDURE — 74011000250 HC RX REV CODE- 250: Performed by: HOSPITALIST

## 2017-12-22 PROCEDURE — 74011250636 HC RX REV CODE- 250/636: Performed by: INTERNAL MEDICINE

## 2017-12-22 PROCEDURE — 84439 ASSAY OF FREE THYROXINE: CPT | Performed by: HOSPITALIST

## 2017-12-22 PROCEDURE — 36415 COLL VENOUS BLD VENIPUNCTURE: CPT | Performed by: HOSPITALIST

## 2017-12-22 RX ADMIN — METOPROLOL TARTRATE 25 MG: 25 TABLET ORAL at 20:03

## 2017-12-22 RX ADMIN — AMLODIPINE BESYLATE 5 MG: 5 TABLET ORAL at 09:13

## 2017-12-22 RX ADMIN — Medication 10 ML: at 00:47

## 2017-12-22 RX ADMIN — METRONIDAZOLE 500 MG: 500 INJECTION, SOLUTION INTRAVENOUS at 19:08

## 2017-12-22 RX ADMIN — LEVOFLOXACIN 750 MG: 5 INJECTION, SOLUTION INTRAVENOUS at 03:44

## 2017-12-22 RX ADMIN — Medication 10 ML: at 20:03

## 2017-12-22 RX ADMIN — FOLIC ACID: 5 INJECTION, SOLUTION INTRAMUSCULAR; INTRAVENOUS; SUBCUTANEOUS at 00:47

## 2017-12-22 RX ADMIN — METRONIDAZOLE 500 MG: 500 INJECTION, SOLUTION INTRAVENOUS at 11:48

## 2017-12-22 RX ADMIN — METRONIDAZOLE 500 MG: 500 INJECTION, SOLUTION INTRAVENOUS at 03:51

## 2017-12-22 RX ADMIN — Medication 10 ML: at 03:43

## 2017-12-22 RX ADMIN — FOLIC ACID: 5 INJECTION, SOLUTION INTRAMUSCULAR; INTRAVENOUS; SUBCUTANEOUS at 23:14

## 2017-12-22 RX ADMIN — METOPROLOL TARTRATE 25 MG: 25 TABLET ORAL at 09:13

## 2017-12-22 RX ADMIN — SODIUM CHLORIDE, SODIUM LACTATE, POTASSIUM CHLORIDE, AND CALCIUM CHLORIDE 75 ML/HR: 600; 310; 30; 20 INJECTION, SOLUTION INTRAVENOUS at 12:25

## 2017-12-22 NOTE — PROGRESS NOTES
Hospitalist Progress Note  Shashank Ross MD  Answering service: [phone] OR [phone] from in house phone      Date of Service:  2017  NAME:  Javi Goldman  :  1955  MRN:  [id number]      Admission Summary:    A 16-year-old -American male with past medical history of gout and hypertension presented to the Emergency Department from home accompanied by a sister and brother with chief complaint of abdominal pain. Interval history / Subjective:     Patient seen in followup up of abdominal pain,he is denying any pains. He reports that he had a bowel movement yesterday. Tolerated his breakfast today. Assessment & Plan:     S/P shock from hypovolemia and hypotension  Improved with IVF and Holding PTA bp meds    Abnormal CT scan on admission with possible acute ischemic bowel (on ct scan on admission)  S/p ex lap - unremarkable with normal bowel - possible recovery of ischemic bowel   Continue following for resolution of bowel function  Surgery following  Diet as tolerated  C diff negative    Diarrhea on admission  R/O sepsis   Follow up cultures - so far negative    Lactic acidosis  From shock- reolved    ARCADIO on admission with metabolic acidosis  Possible prerenal in nature versus ATN from Hypotension   Off bicarb as acidosis resolved  Improved with hydration  Renal dose medications  Hold ACE/HCTZ  Renal US negative    Mild Hypokalemia  Will repeat lab. Then replete if still hypokalemic    Bradycardia  Likely vasovagal/Abdominal pain related  Resolved - tele showing nsr  Followup Echo - Normal EF    Etoh Abuse  On CIWA protocol. So far patient cally w/o sign of withdrawal    Hyperglycemia  A1c =5.8  Check Finsgersticks    Cachexia -- underweight on admission  Follow up HIV  Hepatitis panel neg     - had ex lap for diagnosis of ischemic bowel   Code status: Full  DVT prophylaxis: SCD    Care Plan discussed with: Patient/Family, Nurse and   Disposition: TBD   Continue tele monitoring as he is having fevers     Hospital Problems  Date Reviewed: 12/19/2017          Codes Class Noted POA    Lactic acidosis ICD-10-CM: E87.2  ICD-9-CM: 276.2  12/18/2017 Yes        * (Principal)Shock ([address] ICD-10-CM: R57.9  ICD-9-CM: 785.50  12/18/2017 Yes        Syncope ICD-10-CM: R55  ICD-9-CM: 780.2  12/18/2017 Yes        ARF (acute renal failure) (HCC) ICD-10-CM: N17.9  ICD-9-CM: 584.9  12/18/2017 Yes        Symptomatic bradycardia ICD-10-CM: R00.1  ICD-9-CM: 427.89  12/18/2017 Yes        Pneumatosis intestinalis ICD-10-CM: R95.51  ICD-9-CM: 569.89  12/18/2017 Yes                Review of Systems:   A comprehensive review of systems was negative except for that written in the HPI. Vital Signs:    Last 24hrs VS reviewed since prior progress note. Most recent are:  Visit Vitals    /76 (BP 1 Location: Left arm, BP Patient Position: At rest)    Pulse 67    Temp 98.8 °F (37.1 °C)    Resp 16    Ht 5' 2\" (1.575 m)    Wt 46.2 kg (101 lb 13.6 oz)    SpO2 97%    BMI 18.63 kg/m2         Intake/Output Summary (Last 24 hours) at 12/22/17 4104  Last data filed at 12/21/17 1306   Gross per 24 hour   Intake                0 ml   Output                3 ml   Net               -3 ml        Physical Examination:             Constitutional:  No acute distress, cooperative, pleasant    ENT:  Oral mucous moist, oropharynx benign. Neck supple,    Resp:  CTA bilaterally. No wheezing/rhonchi/rales. No accessory muscle use   CV:  Regular rhythm, normal rate, no murmurs, gallops, rubs    GI:  Soft, non distended, Midline Abd incision in place, no dehiscence. non tender. normoactive bowel sounds, no hepatosplenomegaly     Musculoskeletal:  No edema, warm, 2+ pulses throughout    Neurologic:  Moves all extremities. AAOx3, CN II-XII reviewed     Psych:  Good insight, Not anxious nor agitated.        Data Review:    Review and/or order of clinical lab test  Review and/or order of tests in the radiology section of CPT  Decision to obtain old records and/or obtain history from someone other than the patient      Labs:     Recent Labs      12/21/17   0547  12/20/17   0505   WBC  6.1  7.8   HGB  10.4*  9.7*   HCT  29.8*  28.3*   PLT  102*  121*     Recent Labs      12/21/17   1050  12/20/17   0505   NA  134*  137   K  3.3*  3.7   CL  99  106   CO2  27  24   BUN  12  24*   CREA  1.29  1.95*   GLU  91  110*   CA  7.6*  7.3*   PHOS  1.5*   --      Recent Labs      12/21/17   1050   ALB  2.8*     No results for input(s): INR, PTP, APTT in the last 72 hours. No lab exists for component: INREXT, INREXT   No results for input(s): FE, TIBC, PSAT, FERR in the last 72 hours. No results found for: FOL, RBCF   No results for input(s): PH, PCO2, PO2 in the last 72 hours. Recent Labs      12/22/17   0401  12/21/17   1731   CPK   --   1378*   CKNDX   --   0.1   TROIQ  0.31*  0.65*     No results found for: CHOL, CHOLX, CHLST, CHOLV, HDL, LDL, LDLC, DLDLP, TGLX, TRIGL, TRIGP, CHHD, CHHDX  No results found for: [facility]  Lab Results   Component Value Date/Time    Color YELLOW/STRAW 12/20/2017 11:31 PM    Appearance CLEAR 12/20/2017 11:31 PM    Specific gravity 1.015 12/20/2017 11:31 PM    Specific gravity 1.012 12/18/2017 08:31 PM    pH (UA) 6.0 12/20/2017 11:31 PM    Protein 30 12/20/2017 11:31 PM    Glucose 100 12/20/2017 11:31 PM    Ketone NEGATIVE  12/20/2017 11:31 PM    Bilirubin NEGATIVE  12/20/2017 11:31 PM    Urobilinogen 0.2 12/20/2017 11:31 PM    Nitrites NEGATIVE  12/20/2017 11:31 PM    Leukocyte Esterase NEGATIVE  12/20/2017 11:31 PM    Epithelial cells FEW 12/20/2017 11:31 PM    Bacteria NEGATIVE  12/20/2017 11:31 PM    WBC 0-4 12/20/2017 11:31 PM    RBC 5-10 12/20/2017 11:31 PM     ECHO  Left ventricle: Systolic function was normal. Ejection fraction was  estimated in the range of 55 % to 60 %.  There were no regional wall motion  abnormalities. Medications Reviewed:     Current Facility-Administered Medications   Medication Dose Route Frequency    metoprolol tartrate (LOPRESSOR) tablet 25 mg  25 mg Oral Q12H    metroNIDAZOLE (FLAGYL) IVPB premix 500 mg  500 mg IntraVENous Q8H    levoFLOXacin (LEVAQUIN) 750 mg in D5W IVPB  750 mg IntraVENous Q48H    lactated Ringers infusion  75 mL/hr IntraVENous CONTINUOUS    amLODIPine (NORVASC) tablet 5 mg  5 mg Oral DAILY    acetaminophen (TYLENOL) tablet 650 mg  650 mg Oral Q6H PRN    albuterol (PROVENTIL VENTOLIN) nebulizer solution 5 mg  5 mg Nebulization Q6H PRN    sodium chloride (NS) flush 5-10 mL  5-10 mL IntraVENous Q8H    sodium chloride (NS) flush 5-10 mL  5-10 mL IntraVENous PRN    morphine injection 2 mg  2 mg IntraVENous Q2H PRN    0.9% sodium chloride 1,000 mL with mvi, adult no. 4 with vit K 10 mL, thiamine 323 mg, folic acid 1 mg infusion   IntraVENous Q24H    LORazepam (ATIVAN) injection 1 mg  1 mg IntraVENous Q4H PRN    LORazepam (ATIVAN) injection 2 mg  2 mg IntraVENous Q6H PRN     ______________________________________________________________________  EXPECTED LENGTH OF STAY: 3d 16h  ACTUAL LENGTH OF STAY:          4                 Sergio Hector MD

## 2017-12-22 NOTE — PROGRESS NOTES
[facility]   [address] [facility], [address] [place] [address], [facility]  Phone: [phone]   [phone]       Nephrology Progress Note  Osman Howe     [birth year]     [id number]  Date of Admission : 12/18/2017 12/21/17    CC: Follow up for ARCADIO     Assessment and Plan   ARCADIO :   - perfusional injury   - resolving w/ IVF     Metabolic acidosis : resolved     Hypokalemia :  - replete PRN       Not much to offer from renal stand point   Will sign off  Please call if any questions / concerns      Interval History:  Seen and examined   Doing well   Has BM   Abdomen - no pain   Cr down   Good UOP   Trops mildly elevated     Review of Systems: Pertinent items are noted in HPI. Current Medications:   Current Facility-Administered Medications   Medication Dose Route Frequency    potassium chloride 10 mEq in 50 ml IVPB  10 mEq IntraVENous Q1H    metoprolol tartrate (LOPRESSOR) tablet 25 mg  25 mg Oral Q12H    metroNIDAZOLE (FLAGYL) IVPB premix 500 mg  500 mg IntraVENous Q8H    levoFLOXacin (LEVAQUIN) 750 mg in D5W IVPB  750 mg IntraVENous Q48H    aspirin (ASPIRIN) tablet 325 mg  325 mg Oral ONCE    lactated Ringers infusion  75 mL/hr IntraVENous CONTINUOUS    amLODIPine (NORVASC) tablet 5 mg  5 mg Oral DAILY    acetaminophen (TYLENOL) tablet 650 mg  650 mg Oral Q6H PRN    albuterol (PROVENTIL VENTOLIN) nebulizer solution 5 mg  5 mg Nebulization Q6H PRN    sodium chloride (NS) flush 5-10 mL  5-10 mL IntraVENous Q8H    sodium chloride (NS) flush 5-10 mL  5-10 mL IntraVENous PRN    morphine injection 2 mg  2 mg IntraVENous Q2H PRN    0.9% sodium chloride 1,000 mL with mvi, adult no. 4 with vit K 10 mL, thiamine 850 mg, folic acid 1 mg infusion   IntraVENous Q24H    LORazepam (ATIVAN) injection 1 mg  1 mg IntraVENous Q4H PRN    LORazepam (ATIVAN) injection 2 mg  2 mg IntraVENous Q6H PRN      No Known Allergies    Objective:  Vitals:    Vitals:    12/21/17 1553 12/21/17 1740 12/21/17 1754 12/21/17 1810   BP:  136/90 124/84 (!) 143/97   Pulse:  85 89 94   Resp:       Temp: 99.7 °F (37.6 °C)      SpO2:       Weight:       Height:         Intake and Output:     12/20 0701 - 12/21 1900  In: 340 [P.O.:340]  Out: 3 [Urine:2]    Physical Examination:    General: NAD,Conversant   Neck:  Supple, no mass  Resp:  Lungs CTA   CV:  RRR,  no murmur or rub, LE edema  GI:  Midline incision, soft, NT  Neurologic:  Non focal  Psych:             AAO x 3 appropriate affect   Skin:  No Rash  :  No chung    []    High complexity decision making was performed  []    Patient is at high-risk of decompensation with multiple organ involvement    Lab Data Personally Reviewed: I have reviewed all the pertinent labs, microbiology data and radiology studies during assessment.     Recent Labs      12/21/17   1050  12/20/17   0505  12/19/17   0443  12/18/17 1917   NA  134*  137  140  130*   K  3.3*  3.7  4.7  4.3   CL  99  106  112*  97   CO2  27  24  16*  19*   GLU  91  110*  125*  219*   BUN  12  24*  38*  45*   CREA  1.29  1.95*  3.00*  5.01*   CA  7.6*  7.3*  7.2*  9.1   MG   --    --   1.5*  1.8   PHOS  1.5*   --   4.8*  7.3*   ALB  2.8*   --   2.9*  4.1   SGOT   --    --   28  32   ALT   --    --   16  24     Recent Labs      12/21/17   0547  12/20/17   0505  12/19/17   0443  12/18/17 1917   WBC  6.1  7.8  7.6  9.8   HGB  10.4*  9.7*  10.1*  13.1   HCT  29.8*  28.3*  29.6*  38.5   PLT  102*  121*  116*  181     No results found for: SDES  Lab Results   Component Value Date/Time    Culture result: PENDING 12/21/2017 12:47 PM    Culture result: NO GROWTH AFTER 10 HOURS 12/20/2017 11:59 PM    Culture result: NO GROWTH 2 DAYS 12/18/2017 08:31 PM    Culture result: NO GROWTH 3 DAYS 12/18/2017 07:24 PM     Recent Results (from the past 24 hour(s))   URINALYSIS W/ RFLX MICROSCOPIC    Collection Time: 12/20/17 11:31 PM   Result Value Ref Range    Color YELLOW/STRAW      Appearance CLEAR CLEAR      Specific gravity 1.015 1.003 - 1.030      pH (UA) 6.0 5.0 - 8.0      Protein 30 (A) NEG mg/dL    Glucose 100 (A) NEG mg/dL    Ketone NEGATIVE  NEG mg/dL    Bilirubin NEGATIVE  NEG      Blood MODERATE (A) NEG      Urobilinogen 0.2 0.2 - 1.0 EU/dL    Nitrites NEGATIVE  NEG      Leukocyte Esterase NEGATIVE  NEG     URINE MICROSCOPIC ONLY    Collection Time: 12/20/17 11:31 PM   Result Value Ref Range    WBC 0-4 0 - 4 /hpf    RBC 5-10 0 - 5 /hpf    Epithelial cells FEW FEW /lpf    Bacteria NEGATIVE  NEG /hpf   CULTURE, BLOOD, PAIRED    Collection Time: 12/20/17 11:59 PM   Result Value Ref Range    Special Requests: NO SPECIAL REQUESTS      Culture result: NO GROWTH AFTER 10 HOURS     CBC WITH AUTOMATED DIFF    Collection Time: 12/21/17  5:47 AM   Result Value Ref Range    WBC 6.1 4.1 - 11.1 K/uL    RBC 3.39 (L) 4.10 - 5.70 M/uL    HGB 10.4 (L) 12.1 - 17.0 g/dL    HCT 29.8 (L) 36.6 - 50.3 %    MCV 87.9 80.0 - 99.0 FL    MCH 30.7 26.0 - 34.0 PG    MCHC 34.9 30.0 - 36.5 g/dL    RDW 13.8 11.5 - 14.5 %    PLATELET 430 (L) 186 - 400 K/uL    NEUTROPHILS 85 (H) 32 - 75 %    LYMPHOCYTES 10 (L) 12 - 49 %    MONOCYTES 5 5 - 13 %    EOSINOPHILS 0 0 - 7 %    BASOPHILS 0 0 - 1 %    ABS. NEUTROPHILS 5.2 1.8 - 8.0 K/UL    ABS. LYMPHOCYTES 0.6 (L) 0.8 - 3.5 K/UL    ABS. MONOCYTES 0.3 0.0 - 1.0 K/UL    ABS. EOSINOPHILS 0.0 0.0 - 0.4 K/UL    ABS.  BASOPHILS 0.0 0.0 - 0.1 K/UL   RENAL FUNCTION PANEL    Collection Time: 12/21/17 10:50 AM   Result Value Ref Range    Sodium 134 (L) 136 - 145 mmol/L    Potassium 3.3 (L) 3.5 - 5.1 mmol/L    Chloride 99 97 - 108 mmol/L    CO2 27 21 - 32 mmol/L    Anion gap 8 5 - 15 mmol/L    Glucose 91 65 - 100 mg/dL    BUN 12 6 - 20 MG/DL    Creatinine 1.29 0.70 - 1.30 MG/DL    BUN/Creatinine ratio 9 (L) 12 - 20      GFR est AA >60 >60 ml/min/1.73m2    GFR est non-AA 57 (L) >60 ml/min/1.73m2    Calcium 7.6 (L) 8.5 - 10.1 MG/DL    Phosphorus 1.5 (L) 2.6 - 4.7 MG/DL    Albumin 2.8 (L) 3.5 - 5.0 g/dL   CULTURE, STOOL    Collection Time: 12/21/17 12:47 PM   Result Value Ref Range    Special Requests: STOOL      Campylobacter antigen POSITIVE      Campylobacter antigen        CALLED TO AND READ BACK BY  MS AGATHA COOK [facility]NMS) ON 12/21/17 AT 1739. [facility]      Culture result: PENDING    CK W/ CKMB & INDEX    Collection Time: 12/21/17  5:31 PM   Result Value Ref Range    CK 1378 (H) 39 - 308 U/L    CK - MB 1.4 <3.6 NG/ML    CK-MB Index 0.1 0 - 2.5     TROPONIN I    Collection Time: 12/21/17  5:31 PM   Result Value Ref Range    Troponin-I, Qt. 0.65 (H) <0.05 ng/mL           I have reviewed the flowsheets. Chart and Pertinent Notes have been reviewed. No change in PMH ,family and social history from Consult note.       Goyo Garcia MD

## 2017-12-22 NOTE — PROGRESS NOTES
TRANSFER - OUT REPORT:    Verbal report given to Felton Stringer RN (name) on Yolanda Merchant  being transferred to [facility] (unit) for change in patient condition(increased HR, abnormal cardiac labs & EKG)       Report consisted of patients Situation, Background, Assessment and   Recommendations(SBAR). Information from the following report(s) SBAR, Kardex, Intake/Output, MAR and Recent Results was reviewed with the receiving nurse. Lines:   Triple Lumen 12/19/17 Right Neck (Active)   Central Line Being Utilized Yes 12/21/2017  7:51 AM   Criteria for Appropriate Use Hemodynamically unstable, requiring monitoring lines, vasopressors, or volume resuscitation 12/21/2017  7:51 AM   Site Assessment Clean, dry, & intact 12/21/2017  7:51 AM   Infiltration Assessment 0 12/21/2017  7:51 AM   Affected Extremity/Extremities Color distal to insertion site pink (or appropriate for race) 12/21/2017  7:51 AM   Date of Last Dressing Change 12/21/17 12/21/2017  7:51 AM   Dressing Status Clean, dry, & intact 12/21/2017  7:51 AM   Dressing Type Disk with Chlorhexadine gluconate (CHG); Transparent 12/21/2017  7:51 AM   Action Taken Open ports on tubing capped 12/21/2017  7:51 AM   Proximal Hub Color/Line Status White; Infusing;Capped;Flushed 12/21/2017  7:51 AM   Positive Blood Return (Medial Site) Yes 12/21/2017  7:51 AM   Medial Hub Color/Line Status Blue;Capped;Flushed 12/21/2017  7:51 AM   Positive Blood Return (Lateral Site) Yes 12/21/2017  7:51 AM   Distal Hub Color/Line Status Cari Perkins; Infusing;Capped;Flushed 12/21/2017  7:51 AM   Positive Blood Return (Site #3) Yes 12/21/2017  7:51 AM   Alcohol Cap Used Yes 12/21/2017  7:51 AM        Opportunity for questions and clarification was provided.       Patient transported with:   Monitor  O2 @ 2 liters  Registered Nurse

## 2017-12-22 NOTE — PROGRESS NOTES
General Surgery Daily Progress Note    Admit Date: 2017  Post-Operative Day: 4 Days Post-Op from Procedure(s):  LAPAROTOMY EXPLORATORY     Subjective:     Last 24 hrs: Patient doing well this morning. Says he slept well overnight. No c/o pain today. No BM, + flatus. Ate cream of wheat yesterday & tolerated well without NV. Looking forward to cream of wheat & milk for breakfast today. Plans to walk today. Reports dry cough. No CP or SOB. No fevers or chills. Worked with PT yesterday      Objective:     Blood pressure 110/76, pulse 67, temperature 98.8 °F (37.1 °C), resp. rate 16, height 5' 2\" (1.575 m), weight 101 lb 13.6 oz (46.2 kg), SpO2 97 %. Temp (24hrs), Av.9 °F (37.7 °C), Min:98.8 °F (37.1 °C), Max:102.8 °F (39.3 °C)      _____________________  Physical Exam:     Alert and Oriented, cooperative, in no acute distress. Cardiovascular: RRR, no peripheral edema  Lungs:CTAB   Abdomen: soft, flat, nontender. +BS      Assessment:   Principal Problem:    Shock ([address] (2017)    Active Problems:    Lactic acidosis (2017)      Syncope (2017)      ARF (acute renal failure) ([address] (2017)      Symptomatic bradycardia (2017)      Pneumatosis intestinalis (2017)            Plan:     Continue to encourage PO intake. Ambulation with assistance & OOB. IV antibiotics. AM labs. Continue PT  Medical management per Medicine Team.    Data Review:    Recent Labs      17   0547  17   0505   WBC  6.1  7.8   HGB  10.4*  9.7*   HCT  29.8*  28.3*   PLT  102*  121*     Recent Labs      17   1050  17   0505   NA  134*  137   K  3.3*  3.7   CL  99  106   CO2  27  24   GLU  91  110*   BUN  12  24*   CREA  1.29  1.95*   CA  7.6*  7.3*   PHOS  1.5*   --    ALB  2.8*   --      No results for input(s): AML, LPSE in the last 72 hours.         ______________________  Medications:    Current Facility-Administered Medications   Medication Dose Route Frequency    metoprolol tartrate (LOPRESSOR) tablet 25 mg  25 mg Oral Q12H    metroNIDAZOLE (FLAGYL) IVPB premix 500 mg  500 mg IntraVENous Q8H    levoFLOXacin (LEVAQUIN) 750 mg in D5W IVPB  750 mg IntraVENous Q48H    lactated Ringers infusion  75 mL/hr IntraVENous CONTINUOUS    amLODIPine (NORVASC) tablet 5 mg  5 mg Oral DAILY    acetaminophen (TYLENOL) tablet 650 mg  650 mg Oral Q6H PRN    albuterol (PROVENTIL VENTOLIN) nebulizer solution 5 mg  5 mg Nebulization Q6H PRN    sodium chloride (NS) flush 5-10 mL  5-10 mL IntraVENous Q8H    sodium chloride (NS) flush 5-10 mL  5-10 mL IntraVENous PRN    morphine injection 2 mg  2 mg IntraVENous Q2H PRN    0.9% sodium chloride 1,000 mL with mvi, adult no. 4 with vit K 10 mL, thiamine 576 mg, folic acid 1 mg infusion   IntraVENous Q24H    LORazepam (ATIVAN) injection 1 mg  1 mg IntraVENous Q4H PRN    LORazepam (ATIVAN) injection 2 mg  2 mg IntraVENous Q6H PRN       Koren Cooks, PA-C  12/22/2017

## 2017-12-22 NOTE — PROGRESS NOTES
TRANSFER - OUT REPORT:    Verbal report given to JOEY Hernández(name) on Shweta Schwartz  being transferred to [facility](unit) for routine progression of care       Report consisted of patients Situation, Background, Assessment and   Recommendations(SBAR). Information from the following report(s) SBAR, MAR and Recent Results was reviewed with the receiving nurse. Lines:   Triple Lumen 12/19/17 Right Neck (Active)   Central Line Being Utilized Yes 12/22/2017 12:00 PM   Criteria for Appropriate Use Limited/no vessel suitable for conventional peripheral access 12/22/2017 12:00 PM   Site Assessment Clean, dry, & intact 12/22/2017 12:00 PM   Infiltration Assessment 0 12/22/2017 12:00 PM   Affected Extremity/Extremities Color distal to insertion site pink (or appropriate for race) 12/22/2017 12:00 PM   Date of Last Dressing Change 12/18/17 12/22/2017 12:00 PM   Dressing Status Clean, dry, & intact 12/22/2017 12:00 PM   Dressing Type Disk with Chlorhexadine gluconate (CHG); Tape;Transparent 12/22/2017 12:00 PM   Action Taken Open ports on tubing capped 12/22/2017 12:00 PM   Proximal Hub Color/Line Status White;Capped 12/22/2017 12:00 PM   Positive Blood Return (Medial Site) Yes 12/22/2017 12:00 PM   Medial Hub Color/Line Status Blue;Flushed; Infusing 12/22/2017 12:00 PM   Positive Blood Return (Lateral Site) Yes 12/22/2017 12:00 PM   Distal Hub Color/Line Status Brown; Infusing;Flushed 12/22/2017 12:00 PM   Positive Blood Return (Site #3) Yes 12/22/2017 12:00 PM   Alcohol Cap Used Yes 12/22/2017 12:00 PM        Opportunity for questions and clarification was provided.       Patient transported with:   [employer]

## 2017-12-23 LAB
ANION GAP SERPL CALC-SCNC: 10 MMOL/L (ref 5–15)
BACTERIA SPEC CULT: NORMAL
BASOPHILS # BLD: 0 K/UL (ref 0–0.1)
BASOPHILS NFR BLD: 0 % (ref 0–1)
BUN SERPL-MCNC: 10 MG/DL (ref 6–20)
BUN/CREAT SERPL: 11 (ref 12–20)
C JEJUNI+C COLI AG STL QL: NORMAL
C JEJUNI+C COLI AG STL QL: POSITIVE
CALCIUM SERPL-MCNC: 7.7 MG/DL (ref 8.5–10.1)
CHLORIDE SERPL-SCNC: 102 MMOL/L (ref 97–108)
CO2 SERPL-SCNC: 25 MMOL/L (ref 21–32)
CREAT SERPL-MCNC: 0.9 MG/DL (ref 0.7–1.3)
E COLI SXT1+2 STL IA: NO GROWTH
EOSINOPHIL # BLD: 0 K/UL (ref 0–0.4)
EOSINOPHIL NFR BLD: 0 % (ref 0–7)
ERYTHROCYTE [DISTWIDTH] IN BLOOD BY AUTOMATED COUNT: 13.7 % (ref 11.5–14.5)
GLUCOSE SERPL-MCNC: 89 MG/DL (ref 65–100)
HCT VFR BLD AUTO: 28.2 % (ref 36.6–50.3)
HGB BLD-MCNC: 9.8 G/DL (ref 12.1–17)
LYMPHOCYTES # BLD: 1.1 K/UL (ref 0.8–3.5)
LYMPHOCYTES NFR BLD: 24 % (ref 12–49)
MCH RBC QN AUTO: 30.6 PG (ref 26–34)
MCHC RBC AUTO-ENTMCNC: 34.8 G/DL (ref 30–36.5)
MCV RBC AUTO: 88.1 FL (ref 80–99)
MONOCYTES # BLD: 0.6 K/UL (ref 0–1)
MONOCYTES NFR BLD: 12 % (ref 5–13)
NEUTS SEG # BLD: 2.9 K/UL (ref 1.8–8)
NEUTS SEG NFR BLD: 64 % (ref 32–75)
PLATELET # BLD AUTO: 116 K/UL (ref 150–400)
POTASSIUM SERPL-SCNC: 3.4 MMOL/L (ref 3.5–5.1)
RBC # BLD AUTO: 3.2 M/UL (ref 4.1–5.7)
SERVICE CMNT-IMP: NORMAL
SERVICE CMNT-IMP: NORMAL
SODIUM SERPL-SCNC: 137 MMOL/L (ref 136–145)
WBC # BLD AUTO: 4.6 K/UL (ref 4.1–11.1)

## 2017-12-23 PROCEDURE — 85025 COMPLETE CBC W/AUTO DIFF WBC: CPT | Performed by: INTERNAL MEDICINE

## 2017-12-23 PROCEDURE — 74011250637 HC RX REV CODE- 250/637: Performed by: HOSPITALIST

## 2017-12-23 PROCEDURE — 94640 AIRWAY INHALATION TREATMENT: CPT

## 2017-12-23 PROCEDURE — 80048 BASIC METABOLIC PNL TOTAL CA: CPT | Performed by: INTERNAL MEDICINE

## 2017-12-23 PROCEDURE — 74011000250 HC RX REV CODE- 250: Performed by: INTERNAL MEDICINE

## 2017-12-23 PROCEDURE — 74011250636 HC RX REV CODE- 250/636: Performed by: INTERNAL MEDICINE

## 2017-12-23 PROCEDURE — 36415 COLL VENOUS BLD VENIPUNCTURE: CPT | Performed by: INTERNAL MEDICINE

## 2017-12-23 PROCEDURE — 74011250636 HC RX REV CODE- 250/636: Performed by: HOSPITALIST

## 2017-12-23 PROCEDURE — 74011250637 HC RX REV CODE- 250/637: Performed by: INTERNAL MEDICINE

## 2017-12-23 PROCEDURE — 65270000032 HC RM SEMIPRIVATE

## 2017-12-23 PROCEDURE — 77030029684 HC NEB SM VOL KT MONA -A

## 2017-12-23 PROCEDURE — 74011000250 HC RX REV CODE- 250: Performed by: HOSPITALIST

## 2017-12-23 RX ORDER — GUAIFENESIN 600 MG/1
600 TABLET, EXTENDED RELEASE ORAL EVERY 12 HOURS
Status: DISCONTINUED | OUTPATIENT
Start: 2017-12-23 | End: 2017-12-24 | Stop reason: HOSPADM

## 2017-12-23 RX ORDER — FOLIC ACID 1 MG/1
1 TABLET ORAL DAILY
Status: DISCONTINUED | OUTPATIENT
Start: 2017-12-23 | End: 2017-12-24 | Stop reason: HOSPADM

## 2017-12-23 RX ORDER — THERA TABS 400 MCG
1 TAB ORAL DAILY
Status: DISCONTINUED | OUTPATIENT
Start: 2017-12-23 | End: 2017-12-24 | Stop reason: HOSPADM

## 2017-12-23 RX ORDER — POTASSIUM CHLORIDE 750 MG/1
40 TABLET, FILM COATED, EXTENDED RELEASE ORAL DAILY
Status: DISCONTINUED | OUTPATIENT
Start: 2017-12-24 | End: 2017-12-24 | Stop reason: HOSPADM

## 2017-12-23 RX ORDER — LEVOFLOXACIN 5 MG/ML
750 INJECTION, SOLUTION INTRAVENOUS EVERY 24 HOURS
Status: DISCONTINUED | OUTPATIENT
Start: 2017-12-23 | End: 2017-12-24 | Stop reason: HOSPADM

## 2017-12-23 RX ORDER — LANOLIN ALCOHOL/MO/W.PET/CERES
100 CREAM (GRAM) TOPICAL DAILY
Status: DISCONTINUED | OUTPATIENT
Start: 2017-12-23 | End: 2017-12-24 | Stop reason: HOSPADM

## 2017-12-23 RX ORDER — IPRATROPIUM BROMIDE AND ALBUTEROL SULFATE 2.5; .5 MG/3ML; MG/3ML
3 SOLUTION RESPIRATORY (INHALATION)
Status: DISCONTINUED | OUTPATIENT
Start: 2017-12-23 | End: 2017-12-24 | Stop reason: HOSPADM

## 2017-12-23 RX ADMIN — Medication 10 ML: at 21:18

## 2017-12-23 RX ADMIN — SODIUM CHLORIDE, SODIUM LACTATE, POTASSIUM CHLORIDE, AND CALCIUM CHLORIDE 75 ML/HR: 600; 310; 30; 20 INJECTION, SOLUTION INTRAVENOUS at 02:34

## 2017-12-23 RX ADMIN — METOPROLOL TARTRATE 25 MG: 25 TABLET ORAL at 21:17

## 2017-12-23 RX ADMIN — THERA TABS 1 TABLET: TAB at 14:59

## 2017-12-23 RX ADMIN — IPRATROPIUM BROMIDE AND ALBUTEROL SULFATE 3 ML: .5; 3 SOLUTION RESPIRATORY (INHALATION) at 19:25

## 2017-12-23 RX ADMIN — METRONIDAZOLE 500 MG: 500 INJECTION, SOLUTION INTRAVENOUS at 18:29

## 2017-12-23 RX ADMIN — METRONIDAZOLE 500 MG: 500 INJECTION, SOLUTION INTRAVENOUS at 03:08

## 2017-12-23 RX ADMIN — METOPROLOL TARTRATE 25 MG: 25 TABLET ORAL at 08:19

## 2017-12-23 RX ADMIN — Medication 10 ML: at 05:45

## 2017-12-23 RX ADMIN — Medication 10 ML: at 15:00

## 2017-12-23 RX ADMIN — AMLODIPINE BESYLATE 5 MG: 5 TABLET ORAL at 08:19

## 2017-12-23 RX ADMIN — Medication 100 MG: at 14:59

## 2017-12-23 RX ADMIN — LEVOFLOXACIN 750 MG: 5 INJECTION, SOLUTION INTRAVENOUS at 11:33

## 2017-12-23 RX ADMIN — METRONIDAZOLE 500 MG: 500 INJECTION, SOLUTION INTRAVENOUS at 11:26

## 2017-12-23 RX ADMIN — IPRATROPIUM BROMIDE AND ALBUTEROL SULFATE 3 ML: .5; 3 SOLUTION RESPIRATORY (INHALATION) at 15:28

## 2017-12-23 RX ADMIN — GUAIFENESIN 600 MG: 600 TABLET, EXTENDED RELEASE ORAL at 21:17

## 2017-12-23 RX ADMIN — GUAIFENESIN 600 MG: 600 TABLET, EXTENDED RELEASE ORAL at 14:59

## 2017-12-23 RX ADMIN — FOLIC ACID 1 MG: 1 TABLET ORAL at 14:59

## 2017-12-23 NOTE — PROGRESS NOTES
Renal Dosing/Monitoring  Medication: Levofloxacin   Current regimen:  750 every 48 hr  Recent Labs      12/23/17   0311  12/21/17   1050   CREA  0.90  1.29   BUN  10  12     Estimated CrCl:  ~55 ml/min  Plan: Change to 750 mg Q 24 hours to complete a total course of 7 doses (12/21 - 12/27). per [address] [place] [address] P&T Committee Protocol with respect to renal function. Pharmacy will continue to monitor patient daily and will make dosage adjustments based upon changing renal function.

## 2017-12-23 NOTE — ROUTINE PROCESS
Bedside shift change report given to [address] [place] [address] (oncoming nurse) by Preet Hammond (offgoing nurse). Report included the following information SBAR, Kardex, Intake/Output and MAR.

## 2017-12-23 NOTE — PROGRESS NOTES
[facility]   [address] [place] [address], [facility]. [address]  [phone]                                         Date of Service:  2017  NAME:  Praveena Oliver  :  [birth year]  MRN:  [id number]      Admission Summary:    A 67-year-old -American male with past medical history of gout and hypertension presented to the Emergency Department from home accompanied by a sister and brother with chief complaint of abdominal pain. Interval history / Subjective:   Patient seen in followup up of abdominal pain,he is denying any pains. He reports that he had a bowel movement yesterday. Tolerating po GI lite diet & Ensure since 17. Assessment & Plan:     S/P shock from hypovolemia and hypotension, now HTN  resolved with IVF, on Po Norvasc since 17. Abnormal CT scan on admission with possible acute ischemic bowel (on ct scan on admission)  S/p ex lap on 17 - unremarkable with normal bowel - possible recovery of ischemic bowel   Now has resolution of bowel function to normal  Surgery following  GI Diet being tolerated  C diff negative    Diarrhea on admission  cultures - so far negative    Lactic acidosis  From shock- reolved    ARCADIO on admission with metabolic acidosis, resolved  Possible prerenal in nature versus ATN from Hypotension   Off bicarb as acidosis resolved  Improved with hydration  Renal dose medications  Hold ACE/HCTZ  Renal US negative  Nephrology has signed off. Mild Hypokalemia  Replete & recheck. Bradycardia  Likely vasovagal/Abdominal pain related  Resolved - tele showing nsr  Followup Echo - Normal EF    Etoh Abuse  On iv banana bag, will change to po Thiamine + folate + MVI  On CIWA protocol. So far patient cally w/o sign of withdrawal    Fever, CXR / shows New patchy right perihilar airspace disease.  - on iv zosyn from 17 - , changed to iv levaquin 17  - encourage IS.  - Add bronchodilators  - Antitussives. - Bl cx from 12.18/17 & 12/20/17 remains ngt. Hyperglycemia  A1c =5.8  Check Finsgersticks    Cachexia -- underweight on admission  Acute Hepatitis panel neg    Pt with childhood developmental delay PTA.    12/18 - had ex lap for diagnosis of ischemic bowel   Code status: Full  DVT prophylaxis: SCD    Care Plan discussed with: Patient/Family, Nurse and . Brother Hiren Khoury is [de-identified], tel no [phone] updated on 12/23/17. Disposition: PT eval noted, no needs identified, likely can dc to home on 12/24/17 with po ABX & if cleared by [facility] Problems  Date Reviewed: 12/19/2017          Codes Class Noted POA    Lactic acidosis ICD-10-CM: E87.2  ICD-9-CM: 276.2  12/18/2017 Yes        * (Principal)Shock ([address] ICD-10-CM: R57.9  ICD-9-CM: 785.50  12/18/2017 Yes        Syncope ICD-10-CM: R55  ICD-9-CM: 780.2  12/18/2017 Yes        ARF (acute renal failure) (HCC) ICD-10-CM: N17.9  ICD-9-CM: 584.9  12/18/2017 Yes        Symptomatic bradycardia ICD-10-CM: R00.1  ICD-9-CM: 427.89  12/18/2017 Yes        Pneumatosis intestinalis ICD-10-CM: D44.09  ICD-9-CM: 569.89  12/18/2017 Yes                Review of Systems:   A comprehensive review of systems was negative except for that written in the HPI. Vital Signs:    Last 24hrs VS reviewed since prior progress note.  Most recent are:  Visit Vitals    /88 (BP 1 Location: Right arm, BP Patient Position: At rest)    Pulse 74    Temp 98 °F (36.7 °C)    Resp 18    Ht 5' 2\" (1.575 m)    Wt 45.7 kg (100 lb 12 oz)    SpO2 95%    BMI 18.43 kg/m2     Patient Vitals for the past 24 hrs:   Temp Pulse Resp BP SpO2   12/23/17 0812 98 °F (36.7 °C) 74 18 142/88 95 %   12/22/17 2310 98.7 °F (37.1 °C) 71 18 117/75 95 %   12/22/17 1953 98.8 °F (37.1 °C) 89 18 (!) 139/92 94 %       Intake/Output Summary (Last 24 hours) at 12/23/17 1358  Last data filed at 12/22/17 2050   Gross per 24 hour   Intake                0 ml Output              250 ml   Net             -250 ml        Physical Examination:             Constitutional:  No acute distress, cooperative, pleasant    ENT:  Oral mucous moist, oropharynx benign. Neck supple,    Resp:  CTA bilaterally. No wheezing/rhonchi/rales. No accessory muscle use   CV:  Regular rhythm, normal rate, no murmurs, gallops, rubs    GI:  Soft, non distended, Midline Abd incision in place, no dehiscence. non tender. normoactive bowel sounds, no hepatosplenomegaly     Musculoskeletal:  No edema, warm, 2+ pulses throughout    Neurologic:  Moves all extremities. AAOx3, CN II-XII reviewed     Psych:  Good insight, Not anxious nor agitated. Data Review:    Review and/or order of clinical lab test  Review and/or order of tests in the radiology section of CPT  Decision to obtain old records and/or obtain history from someone other than the patient      Labs:     Recent Labs      12/23/17   0311  12/21/17   0547   WBC  4.6  6.1   HGB  9.8*  10.4*   HCT  28.2*  29.8*   PLT  116*  102*     Recent Labs      12/23/17   0311  12/21/17   1050   NA  137  134*   K  3.4*  3.3*   CL  102  99   CO2  25  27   BUN  10  12   CREA  0.90  1.29   GLU  89  91   CA  7.7*  7.6*   PHOS   --   1.5*     Recent Labs      12/21/17   1050   ALB  2.8*     No results for input(s): INR, PTP, APTT in the last 72 hours. No lab exists for component: INREXT, INREXT   No results for input(s): FE, TIBC, PSAT, FERR in the last 72 hours. No results found for: FOL, RBCF   No results for input(s): PH, PCO2, PO2 in the last 72 hours.   Recent Labs      12/22/17   0401  12/21/17   1731   CPK   --   1378*   CKNDX   --   0.1   TROIQ  0.31*  0.65*     No results found for: CHOL, CHOLX, CHLST, CHOLV, HDL, LDL, LDLC, DLDLP, TGLX, TRIGL, TRIGP, CHHD, CHHDX  No results found for: [facility]  Lab Results   Component Value Date/Time    Color YELLOW/STRAW 12/20/2017 11:31 PM    Appearance CLEAR 12/20/2017 11:31 PM    Specific gravity 1.015 12/20/2017 11:31 PM    Specific gravity 1.012 12/18/2017 08:31 PM    pH (UA) 6.0 12/20/2017 11:31 PM    Protein 30 12/20/2017 11:31 PM    Glucose 100 12/20/2017 11:31 PM    Ketone NEGATIVE  12/20/2017 11:31 PM    Bilirubin NEGATIVE  12/20/2017 11:31 PM    Urobilinogen 0.2 12/20/2017 11:31 PM    Nitrites NEGATIVE  12/20/2017 11:31 PM    Leukocyte Esterase NEGATIVE  12/20/2017 11:31 PM    Epithelial cells FEW 12/20/2017 11:31 PM    Bacteria NEGATIVE  12/20/2017 11:31 PM    WBC 0-4 12/20/2017 11:31 PM    RBC 5-10 12/20/2017 11:31 PM     ECHO  Left ventricle: Systolic function was normal. Ejection fraction was  estimated in the range of 55 % to 60 %. There were no regional wall motion  abnormalities. RADIOLOGY:  * INDICATION:    fevers,     EXAMINATION:  AP CHEST, PORTABLE     COMPARISON: December 19, 2017     FINDINGS: Single AP portable view of the chest at 1110 hours demonstrates  unchanged right IJ line. The cardiomediastinal silhouette is stable. Development  of patchy right perihilar airspace disease. No pneumothorax. Linear atelectasis  medial left lower lobe.     IMPRESSION  IMPRESSION:   New patchy right perihilar airspace disease. No pneumothorax. Signed Date/Time    Phone Pager     Rusty Murillo 12/21/2017 11:36 [phone]     * EXAM: CT ABDOMEN AND PELVIS WITHOUT CONTRAST  INDICATION:  Diarrhea with elevated lactate in acute renal failure. COMPARISON: None. CONTRAST: None.     TECHNIQUE:   Unenhanced multislice helical CT was performed from the diaphragm to the  symphysis pubis without intravenous contrast administration. Oral contrast was  not administered. Contiguous 5 mm axial images were reconstructed and lung and  soft tissue windows were generated. Coronal and sagittal reformations were  generated. CT dose reduction was achieved through use of a standardized protocol  tailored for this examination and automatic exposure control for dose  modulation.  Adaptive statistical iterative reconstruction (ASIR) was utilized  for this examination.     FINDINGS:  LOWER CHEST: The visualized portions of the lung bases are clear. The absence of intravenous contrast material reduces the sensitivity for  evaluation of the solid parenchymal organs of the abdomen. ABDOMEN:  Liver: The liver is normal in size and contour with no focal abnormality. Gallbladder and bile ducts: There are no calcified stones and there is no  biliary duct dilatation. Spleen: No abnormality. Pancreas: No abnormality. Adrenal glands: No abnormality. Kidneys: No focal parenchymal abnormality. There is no renal or ureteral  calculus or obstruction. PELVIS:  Reproductive organs: The prostate gland and seminal vesicles are symmetrical.    Bladder: There is a Ellington catheter in the urinary bladder. The bladder wall is  mildly thickened and there is some air in the bladder. RETROPERITONEUM: The aorta tapers without aneurysm. There is no retroperitoneal  adenopathy or mass. There is no pelvic mass or adenopathy. BOWEL AND MESENTERY: There are multiple thickened small bowel loops with some  pneumatosis intestinalis. The small bowel is not obstructed. The appendix is not  identified. PERITONEUM: There is no ascites or free intraperitoneal air. BONES AND SOFT TISSUES: The bones and soft tissues of the abdominal wall are  within normal limits.     IMPRESSION  IMPRESSION: Thickened small bowel loops with pneumatosis intestinalis. The  appearance suggests possible ischemic bowel.   Signed Date/Time    Phone Pager     Demarcus Keller 12/18/2017 21:18 [phone]     Medications Reviewed:     Current Facility-Administered Medications   Medication Dose Route Frequency    levoFLOXacin (LEVAQUIN) 750 mg in D5W IVPB  750 mg IntraVENous Q24H    albuterol-ipratropium (DUO-NEB) 2.5 MG-0.5 MG/3 ML  3 mL Nebulization Q6H RT    guaiFENesin ER (MUCINEX) tablet 600 mg  600 mg Oral Q12H    [START ON 12/24/2017] potassium chloride SR (KLOR-CON 10) tablet 40 mEq  40 mEq Oral DAILY    thiamine (B-1) tablet 100 mg  100 mg Oral DAILY    therapeutic multivitamin (THERAGRAN) tablet 1 Tab  1 Tab Oral DAILY    folic acid (FOLVITE) tablet 1 mg  1 mg Oral DAILY    metoprolol tartrate (LOPRESSOR) tablet 25 mg  25 mg Oral Q12H    metroNIDAZOLE (FLAGYL) IVPB premix 500 mg  500 mg IntraVENous Q8H    lactated Ringers infusion  25 mL/hr IntraVENous CONTINUOUS    amLODIPine (NORVASC) tablet 5 mg  5 mg Oral DAILY    acetaminophen (TYLENOL) tablet 650 mg  650 mg Oral Q6H PRN    sodium chloride (NS) flush 5-10 mL  5-10 mL IntraVENous Q8H    sodium chloride (NS) flush 5-10 mL  5-10 mL IntraVENous PRN    morphine injection 2 mg  2 mg IntraVENous Q2H PRN    LORazepam (ATIVAN) injection 1 mg  1 mg IntraVENous Q4H PRN    LORazepam (ATIVAN) injection 2 mg  2 mg IntraVENous Q6H PRN     ______________________________________________________________________  EXPECTED LENGTH OF STAY: 3d 16h  ACTUAL LENGTH OF STAY:          5                 Twan Duarte MD

## 2017-12-23 NOTE — PROGRESS NOTES
Pt is Sowmya Fong hospitalist team will manage the pt & transfer care to their service on 12/23/17.   Kodak Persaud MD

## 2017-12-24 VITALS
SYSTOLIC BLOOD PRESSURE: 107 MMHG | HEART RATE: 72 BPM | OXYGEN SATURATION: 97 % | BODY MASS INDEX: 18.09 KG/M2 | DIASTOLIC BLOOD PRESSURE: 75 MMHG | RESPIRATION RATE: 18 BRPM | WEIGHT: 98.33 LBS | HEIGHT: 62 IN | TEMPERATURE: 98.3 F

## 2017-12-24 PROCEDURE — 74011000250 HC RX REV CODE- 250: Performed by: INTERNAL MEDICINE

## 2017-12-24 PROCEDURE — 74011250636 HC RX REV CODE- 250/636: Performed by: HOSPITALIST

## 2017-12-24 PROCEDURE — 74011250637 HC RX REV CODE- 250/637: Performed by: INTERNAL MEDICINE

## 2017-12-24 PROCEDURE — 74011000250 HC RX REV CODE- 250: Performed by: HOSPITALIST

## 2017-12-24 PROCEDURE — 94640 AIRWAY INHALATION TREATMENT: CPT

## 2017-12-24 PROCEDURE — 74011250637 HC RX REV CODE- 250/637: Performed by: HOSPITALIST

## 2017-12-24 RX ORDER — THERA TABS 400 MCG
1 TAB ORAL DAILY
Qty: 30 TAB | Refills: 0 | Status: SHIPPED | OUTPATIENT
Start: 2017-12-25

## 2017-12-24 RX ORDER — CHOLESTYRAMINE 4 G/9G
4 POWDER, FOR SUSPENSION ORAL
Qty: 1 CAN | Refills: 0 | Status: SHIPPED | OUTPATIENT
Start: 2017-12-24

## 2017-12-24 RX ORDER — LEVOFLOXACIN 750 MG/1
750 TABLET ORAL DAILY
Qty: 12 TAB | Refills: 0 | Status: SHIPPED | OUTPATIENT
Start: 2017-12-24

## 2017-12-24 RX ORDER — TRAMADOL HYDROCHLORIDE 50 MG/1
50 TABLET ORAL
Qty: 30 TAB | Refills: 0 | Status: SHIPPED | OUTPATIENT
Start: 2017-12-24

## 2017-12-24 RX ORDER — METOPROLOL TARTRATE 25 MG/1
25 TABLET, FILM COATED ORAL EVERY 12 HOURS
Qty: 60 TAB | Refills: 0 | Status: SHIPPED | OUTPATIENT
Start: 2017-12-24

## 2017-12-24 RX ORDER — POTASSIUM CHLORIDE 1500 MG/1
40 TABLET, FILM COATED, EXTENDED RELEASE ORAL DAILY
Qty: 10 TAB | Refills: 0 | Status: SHIPPED | OUTPATIENT
Start: 2017-12-25

## 2017-12-24 RX ORDER — AMLODIPINE BESYLATE 5 MG/1
5 TABLET ORAL DAILY
Qty: 30 TAB | Refills: 0 | Status: SHIPPED | OUTPATIENT
Start: 2017-12-25

## 2017-12-24 RX ORDER — LANOLIN ALCOHOL/MO/W.PET/CERES
100 CREAM (GRAM) TOPICAL DAILY
Qty: 30 TAB | Refills: 0 | Status: SHIPPED | OUTPATIENT
Start: 2017-12-25

## 2017-12-24 RX ORDER — FOLIC ACID 1 MG/1
1 TABLET ORAL DAILY
Qty: 30 TAB | Refills: 0 | Status: SHIPPED | OUTPATIENT
Start: 2017-12-25

## 2017-12-24 RX ORDER — GUAIFENESIN 600 MG/1
600 TABLET, EXTENDED RELEASE ORAL EVERY 12 HOURS
Qty: 20 TAB | Refills: 0 | Status: SHIPPED | OUTPATIENT
Start: 2017-12-24

## 2017-12-24 RX ORDER — METRONIDAZOLE 500 MG/1
500 TABLET ORAL 3 TIMES DAILY
Qty: 30 TAB | Refills: 0 | Status: SHIPPED | OUTPATIENT
Start: 2017-12-24

## 2017-12-24 RX ADMIN — METRONIDAZOLE 500 MG: 500 INJECTION, SOLUTION INTRAVENOUS at 10:01

## 2017-12-24 RX ADMIN — Medication 5 ML: at 06:00

## 2017-12-24 RX ADMIN — FOLIC ACID 1 MG: 1 TABLET ORAL at 08:38

## 2017-12-24 RX ADMIN — IPRATROPIUM BROMIDE AND ALBUTEROL SULFATE 3 ML: .5; 3 SOLUTION RESPIRATORY (INHALATION) at 09:24

## 2017-12-24 RX ADMIN — METOPROLOL TARTRATE 25 MG: 25 TABLET ORAL at 08:38

## 2017-12-24 RX ADMIN — GUAIFENESIN 600 MG: 600 TABLET, EXTENDED RELEASE ORAL at 08:39

## 2017-12-24 RX ADMIN — POTASSIUM CHLORIDE 40 MEQ: 750 TABLET, FILM COATED, EXTENDED RELEASE ORAL at 08:38

## 2017-12-24 RX ADMIN — THERA TABS 1 TABLET: TAB at 08:38

## 2017-12-24 RX ADMIN — Medication 100 MG: at 08:38

## 2017-12-24 RX ADMIN — METRONIDAZOLE 500 MG: 500 INJECTION, SOLUTION INTRAVENOUS at 04:09

## 2017-12-24 RX ADMIN — LEVOFLOXACIN 750 MG: 5 INJECTION, SOLUTION INTRAVENOUS at 11:33

## 2017-12-24 RX ADMIN — AMLODIPINE BESYLATE 5 MG: 5 TABLET ORAL at 08:38

## 2017-12-24 NOTE — PROGRESS NOTES
Bedside shift change report given to Corinne, RN (oncoming nurse) by Emely Presley RN (offgoing nurse). Report included the following information SBAR, Kardex, Intake/Output and Recent Results.

## 2017-12-24 NOTE — PROGRESS NOTES
Bedside shift change report given to [address] [place] [address] (oncoming nurse) by Ja Parish (offgoing nurse). Report included the following information SBAR, Kardex, Intake/Output and MAR.

## 2017-12-24 NOTE — PROGRESS NOTES
1300 Removed central line using sterile technique, tip intact. Held pressure for five minutes. Patient to lay flat till 1410.    1520 Reviewed discharge instructions with family and patient including follow up appointments, recommended diet and activity levels, wound care, medications and signs and symptoms of when to call the physician. IV removal site clean dry and intact, instructed patient to leave dressing on for 72 hours.

## 2017-12-24 NOTE — PROGRESS NOTES
No c/o today. Ready for discharge. Tm 99.5 HR: 72 BP: 107/75 Resp Rate: 18 97% sat on room air. No intake or output data in the 24 hours ending 12/24/17 1251   Exam: Cor: RRR. Lungs: Bilateral breath sounds. Clear to auscultation. Abd: Soft. Non distended. Non tender. No associated rebound or guarding. Incision is clean. Labs: No results found for this or any previous visit (from the past 12 hour(s)). Diet as tolerated. Saline lock IVF. Remove central line. Can discharge to home. Follow up with Dr. Arturo Lance in 7-10 days or earlier if need be. Plans per Dr. Al Aguilar.

## 2017-12-24 NOTE — DISCHARGE INSTRUCTIONS
[place] [address] [facility], [address]   Alejandra Tony [address]     Discharge Instructions       PATIENT ID: Yohana Zamudio  MRN: [id number]   YOB: 1955    DATE OF ADMISSION: 12/18/2017  7:04 PM    DATE OF DISCHARGE: 12/24/2017    PRIMARY CARE PROVIDER: None       DISCHARGING PHYSICIAN: MD Cecily    To contact this individual call [phone]. DISCHARGE DIAGNOSES Acute ischemic bowel, Post-op R LL pneumonia, Alcohol abuse    CONSULTATIONS: IP CONSULT TO NEPHROLOGY  IP CONSULT TO CARDIOLOGY  IP CONSULT TO HOSPITALIST  IP CONSULT TO GENERAL SURGERY    PROCEDURES/SURGERIES: Procedure(s):  LAPAROTOMY EXPLORATORY    PENDING TEST RESULTS:   At the time of discharge the following test results are still pending: None     FOLLOW UP APPOINTMENTS:   Follow-up Information     Follow up With Details Comments Contact Info    [address]  In 2 days hospital ff-up on 12/26/17, BMP / Mg recheck due on 12/27/17 [address] [place] [address] Brenda Honeycutt MD In 10 days Post-op surgical wound management [address] [address]  [phone]             ADDITIONAL CARE RECOMMENDATIONS: Ambulate, avoid alcohol    DIET: GI Lite diet, Ensure 1 can tid    ACTIVITY: Activity as tolerated    WOUND CARE: Keep surgical scar site dry    EQUIPMENT needed: None      DISCHARGE MEDICATIONS:   See Medication Reconciliation Form    · It is important that you take the medication exactly as they are prescribed. · Keep your medication in the bottles provided by the pharmacist and keep a list of the medication names, dosages, and times to be taken in your wallet. · Do not take other medications without consulting your doctor. NOTIFY YOUR PHYSICIAN FOR ANY OF THE FOLLOWING:   Fever over 101 degrees for 24 hours.    Chest pain, shortness of breath, fever, chills, nausea, vomiting, diarrhea, change in mentation, falling, weakness, bleeding. Severe pain or pain not relieved by medications. Or, any other signs or symptoms that you may have questions about.       DISPOSITION:  x  Home With: family   OT  PT  HH  RN       SNF/Inpatient Rehab/LTAC    Independent/assisted living    Hospice    Other:     CDMP Checked:   Yes Y       Signed:   Stone Vargas MD  12/24/2017  12:56 PM

## 2017-12-24 NOTE — PROGRESS NOTES
[facility]   [facility] [address]  [phone]                                         Date of Service:  2017  NAME:  Guero Tyler  :  [birth year]  MRN:  [id number]      Admission Summary:    A 70-year-old -American male with past medical history of gout and hypertension presented to the Emergency Department from home accompanied by a sister and brother with chief complaint of abdominal pain. Interval history / Subjective:   Patient seen in followup up of abdominal pain,he is denying any pains. RN & pt reports 2 loose bowel movement x last 2 days. Tolerating po GI lite diet & Ensure since 17. Assessment & Plan:     S/P shock from hypovolemia and hypotension, now HTN  resolved with IVF, on Po Norvasc since 17. Abnormal CT scan on admission with possible acute ischemic bowel (on ct scan on admission)  S/p ex lap on 17 - unremarkable with normal bowel - possible recovery of ischemic bowel   Now has resolution of bowel function to normal  Surgery following, needs dc post-op instructions from surgical team.  GI Diet being tolerated  C diff negative    Diarrhea on admission  cultures - so far negative  - Add po floraq & po Questran qday prn diarrheal stool. Lactic acidosis  From shock- reolved    ARCADIO on admission with metabolic acidosis, resolved  Possible prerenal in nature versus ATN from Hypotension   Off bicarb as acidosis resolved  Improved with hydration  Renal dose medications  Hold ACE/HCTZ  Renal US negative  Nephrology has signed off. Mild Hypokalemia  Replete & recheck. Bradycardia  Likely vasovagal/Abdominal pain related  Resolved - tele showing nsr  Followup Echo - Normal EF    Etoh Abuse  On iv banana bag, will change to po Thiamine + folate + MVI  On CIWA protocol. So far patient cally w/o sign of withdrawal    Fever, CXR / shows New patchy right perihilar airspace disease.  - on iv zosyn from 12/18/17 - 12/20, changed to iv levaquin 12/21/17  - encourage IS.  - Add bronchodilators  - Antitussives. - Bl cx from 12.18/17 & 12/20/17 remains ngt. Hyperglycemia  A1c =5.8  Check Finsgersticks    Cachexia -- underweight on admission  Acute Hepatitis panel neg    Pt with childhood developmental delay PTA.    12/18 - had ex lap for diagnosis of ischemic bowel   Code status: Full  DVT prophylaxis: SCD    Care Plan discussed with: Patient/Family, Nurse and . Brother Emilee Miller is [de-identified], tel no [phone] updated on 12/23/17. Disposition: PT eval noted, no needs identified, will dc to home on 12/24/17 with po ABX & if cleared by [address] [facility] Problems  Date Reviewed: 12/19/2017          Codes Class Noted POA    Lactic acidosis ICD-10-CM: E87.2  ICD-9-CM: 276.2  12/18/2017 Yes        * (Principal)Shock ([address] ICD-10-CM: R57.9  ICD-9-CM: 785.50  12/18/2017 Yes        Syncope ICD-10-CM: R55  ICD-9-CM: 780.2  12/18/2017 Yes        ARF (acute renal failure) (HCC) ICD-10-CM: N17.9  ICD-9-CM: 584.9  12/18/2017 Yes        Symptomatic bradycardia ICD-10-CM: R00.1  ICD-9-CM: 427.89  12/18/2017 Yes        Pneumatosis intestinalis ICD-10-CM: C02.21  ICD-9-CM: 569.89  12/18/2017 Yes                Review of Systems:   A comprehensive review of systems was negative except for that written in the HPI. Vital Signs:    Last 24hrs VS reviewed since prior progress note.  Most recent are:  Visit Vitals    /75 (BP 1 Location: Right arm, BP Patient Position: At rest)    Pulse 72    Temp 98.3 °F (36.8 °C)    Resp 18    Ht 5' 2\" (1.575 m)    Wt 44.6 kg (98 lb 5.2 oz)    SpO2 97%    BMI 17.98 kg/m2     Patient Vitals for the past 24 hrs:   Temp Pulse Resp BP SpO2   12/24/17 0924 - - - - 97 %   12/24/17 0746 98.3 °F (36.8 °C) 72 18 107/75 98 %   12/24/17 0004 99.1 °F (37.3 °C) 78 18 122/77 95 %   12/23/17 2031 - (!) 101 - 124/81 -   12/23/17 1925 - - - - 96 % 12/23/17 1608 99.5 °F (37.5 °C) 96 18 (!) 135/94 95 %   12/23/17 1529 - - - - 96 %     No intake or output data in the 24 hours ending 12/24/17 1242     Physical Examination:             Constitutional:  No acute distress, cooperative, pleasant    ENT:  Oral mucous moist, oropharynx benign. Neck supple,    Resp:  CTA bilaterally. No wheezing/rhonchi/rales. No accessory muscle use   CV:  Regular rhythm, normal rate, no murmurs, gallops, rubs    GI:  Soft, non distended, Midline Abd incision in place with staples, no dehiscence. non tender. normoactive bowel sounds, no hepatosplenomegaly     Musculoskeletal:  No edema, warm, 2+ pulses throughout    Neurologic:  Moves all extremities. AAOx3, CN II-XII reviewed     Psych:  Good insight, Not anxious nor agitated. Data Review:    Review and/or order of clinical lab test  Review and/or order of tests in the radiology section of CPT  Decision to obtain old records and/or obtain history from someone other than the patient      Labs:     Recent Labs      12/23/17   0311   WBC  4.6   HGB  9.8*   HCT  28.2*   PLT  116*     Recent Labs      12/23/17   0311   NA  137   K  3.4*   CL  102   CO2  25   BUN  10   CREA  0.90   GLU  89   CA  7.7*     No results for input(s): SGOT, GPT, ALT, AP, TBIL, TBILI, TP, ALB, GLOB, GGT, AML, LPSE in the last 72 hours. No lab exists for component: AMYP, HLPSE  No results for input(s): INR, PTP, APTT in the last 72 hours. No lab exists for component: INREXT, INREXT   No results for input(s): FE, TIBC, PSAT, FERR in the last 72 hours. No results found for: FOL, RBCF   No results for input(s): PH, PCO2, PO2 in the last 72 hours.   Recent Labs      12/22/17   0401  12/21/17   1731   CPK   --   1378*   CKNDX   --   0.1   TROIQ  0.31*  0.65*     No results found for: CHOL, CHOLX, CHLST, CHOLV, HDL, LDL, LDLC, DLDLP, TGLX, TRIGL, TRIGP, CHHD, CHHDX  No results found for: [facility]  Lab Results   Component Value Date/Time    Color YELLOW/STRAW 12/20/2017 11:31 PM    Appearance CLEAR 12/20/2017 11:31 PM    Specific gravity 1.015 12/20/2017 11:31 PM    Specific gravity 1.012 12/18/2017 08:31 PM    pH (UA) 6.0 12/20/2017 11:31 PM    Protein 30 12/20/2017 11:31 PM    Glucose 100 12/20/2017 11:31 PM    Ketone NEGATIVE  12/20/2017 11:31 PM    Bilirubin NEGATIVE  12/20/2017 11:31 PM    Urobilinogen 0.2 12/20/2017 11:31 PM    Nitrites NEGATIVE  12/20/2017 11:31 PM    Leukocyte Esterase NEGATIVE  12/20/2017 11:31 PM    Epithelial cells FEW 12/20/2017 11:31 PM    Bacteria NEGATIVE  12/20/2017 11:31 PM    WBC 0-4 12/20/2017 11:31 PM    RBC 5-10 12/20/2017 11:31 PM     ECHO  Left ventricle: Systolic function was normal. Ejection fraction was  estimated in the range of 55 % to 60 %. There were no regional wall motion  abnormalities. RADIOLOGY:  * INDICATION:    fevers,     EXAMINATION:  AP CHEST, PORTABLE     COMPARISON: December 19, 2017     FINDINGS: Single AP portable view of the chest at 1110 hours demonstrates  unchanged right IJ line. The cardiomediastinal silhouette is stable. Development  of patchy right perihilar airspace disease. No pneumothorax. Linear atelectasis  medial left lower lobe.     IMPRESSION  IMPRESSION:   New patchy right perihilar airspace disease. No pneumothorax. Signed Date/Time    Phone Pager     Tristan Partida 12/21/2017 11:36 [phone]     * EXAM: CT ABDOMEN AND PELVIS WITHOUT CONTRAST  INDICATION:  Diarrhea with elevated lactate in acute renal failure. COMPARISON: None. CONTRAST: None.     TECHNIQUE:   Unenhanced multislice helical CT was performed from the diaphragm to the  symphysis pubis without intravenous contrast administration. Oral contrast was  not administered. Contiguous 5 mm axial images were reconstructed and lung and  soft tissue windows were generated. Coronal and sagittal reformations were  generated.  CT dose reduction was achieved through use of a standardized protocol  tailored for this examination and automatic exposure control for dose  modulation. Adaptive statistical iterative reconstruction (ASIR) was utilized  for this examination.     FINDINGS:  LOWER CHEST: The visualized portions of the lung bases are clear. The absence of intravenous contrast material reduces the sensitivity for  evaluation of the solid parenchymal organs of the abdomen. ABDOMEN:  Liver: The liver is normal in size and contour with no focal abnormality. Gallbladder and bile ducts: There are no calcified stones and there is no  biliary duct dilatation. Spleen: No abnormality. Pancreas: No abnormality. Adrenal glands: No abnormality. Kidneys: No focal parenchymal abnormality. There is no renal or ureteral  calculus or obstruction. PELVIS:  Reproductive organs: The prostate gland and seminal vesicles are symmetrical.    Bladder: There is a Ellington catheter in the urinary bladder. The bladder wall is  mildly thickened and there is some air in the bladder. RETROPERITONEUM: The aorta tapers without aneurysm. There is no retroperitoneal  adenopathy or mass. There is no pelvic mass or adenopathy. BOWEL AND MESENTERY: There are multiple thickened small bowel loops with some  pneumatosis intestinalis. The small bowel is not obstructed. The appendix is not  identified. PERITONEUM: There is no ascites or free intraperitoneal air. BONES AND SOFT TISSUES: The bones and soft tissues of the abdominal wall are  within normal limits.     IMPRESSION  IMPRESSION: Thickened small bowel loops with pneumatosis intestinalis. The  appearance suggests possible ischemic bowel.   Signed Date/Time    Phone Pager     Heliobhargavi Dcelan 12/18/2017 21:18 [phone]     Medications Reviewed:     Current Facility-Administered Medications   Medication Dose Route Frequency    levoFLOXacin (LEVAQUIN) 750 mg in D5W IVPB  750 mg IntraVENous Q24H    albuterol-ipratropium (DUO-NEB) 2.5 MG-0.5 MG/3 ML  3 mL Nebulization Q6H RT    guaiFENesin ER (MUCINEX) tablet 600 mg  600 mg Oral Q12H    potassium chloride SR (KLOR-CON 10) tablet 40 mEq  40 mEq Oral DAILY    thiamine (B-1) tablet 100 mg  100 mg Oral DAILY    therapeutic multivitamin (THERAGRAN) tablet 1 Tab  1 Tab Oral DAILY    folic acid (FOLVITE) tablet 1 mg  1 mg Oral DAILY    metoprolol tartrate (LOPRESSOR) tablet 25 mg  25 mg Oral Q12H    metroNIDAZOLE (FLAGYL) IVPB premix 500 mg  500 mg IntraVENous Q8H    lactated Ringers infusion  25 mL/hr IntraVENous CONTINUOUS    amLODIPine (NORVASC) tablet 5 mg  5 mg Oral DAILY    acetaminophen (TYLENOL) tablet 650 mg  650 mg Oral Q6H PRN    sodium chloride (NS) flush 5-10 mL  5-10 mL IntraVENous Q8H    sodium chloride (NS) flush 5-10 mL  5-10 mL IntraVENous PRN    morphine injection 2 mg  2 mg IntraVENous Q2H PRN    LORazepam (ATIVAN) injection 1 mg  1 mg IntraVENous Q4H PRN    LORazepam (ATIVAN) injection 2 mg  2 mg IntraVENous Q6H PRN     ______________________________________________________________________  EXPECTED LENGTH OF STAY: 3d 16h  ACTUAL LENGTH OF STAY:          6                 Susan Valencia MD

## 2017-12-24 NOTE — DISCHARGE SUMMARY
[address] [place] [address], [address] [place] [address]  Alejandra Tony 2    Discharge Summary     PATIENT ID: Theron Cons  MRN: [id number]   YOB: 1955    DATE OF ADMISSION: 12/18/2017  7:04 PM    DATE OF DISCHARGE: 12/24/2017  PRIMARY CARE PROVIDER: None       DISCHARGING PHYSICIAN: MD Cecily    To contact this individual call [phone]. CONSULTATIONS: IP CONSULT TO NEPHROLOGY  IP CONSULT TO CARDIOLOGY  IP CONSULT TO HOSPITALIST  IP CONSULT TO GENERAL SURGERY    PROCEDURES/SURGERIES: Procedure(s):  LAPAROTOMY EXPLORATORY    ADMITTING DIAGNOSES & HOSPITAL COURSE:   1. Shock/hypotension  -- likely secondary to hypovolemia. At this time, patient will be admitted to ICU. Blood pressure has improved. We will continue with IV fluid resuscitation. Concern now for new CT findings showing possible ischemic bowel with pneumatosis intestinalis. Treat underlying factors. 2.  Acute renal failure. Order 0.9% normal saline 1000 mL with fluid bolus followed by 125 mL IV maintenance fluids. Continue with strict I's and O's, daily weights, monitor urine output closely. Consult with nephrologist.  3.  Pneumatosis intestinalis -- possible ischemic bowel. Results were just received. Will consult with general surgery for evaluation. Continue with volume resuscitation hydration. 4.  Lactic acidosis. Continue with IV fluids until lactic acidosis is corrected. 5.  Generalized abdominal pain. No abdominal tenderness on examination. However, the patient may have some cognitive delay. Given CT findings as mentioned, will consult with general surgery for further recommendations and treatment. 6.  Syncope. Patient currently is on bed rest.  Obtain orthostatic vital signs. May be secondary to noted bradycardia or hypovolemia shock. Will order a 2D echocardiogram, place on telemetry monitoring.   7.  Symptomatic bradycardia. Plan as noted above. Cardiologist was consulted per the ED. 8.  Diarrhea -- has been reported. Order stool for Clostridium difficile, stool occult blood test, ova and parasite, and fecal WBC. 9.  Type 2 diabetes mellitus. Patient has random blood glucose greater than 200. Has not had polyuria, polydipsia, polyphagia. We will assess patient's status. Check hemoglobin A1c level. Check point of care glucose level and place on Humalog insulin correction coverage. 10.  Metabolic acidosis. Continue workup and plan as noted above. 11.  Alcohol abuse. Placed on [facility] alcohol withdrawal protocol, Ativan p.r.n. Order banana bag therapy, multivitamin, thiamine, folate daily therapy. Advised the patient of alcohol reduction or cessation. 12.  Generalized weakness and instability. Place on fall precautions. 13.  Venous thromboembolism prophylaxis. SCDs to bilateral lower extremities. Discussed plan of care with the patient's sister and brother. 14.  Cachexia -- underweight. Plan as noted above. Also, with renal failure lab protocols, also spoke with the patient about checking for HIV and hepatitis, and he consented for the same. The patient, also, noted it was acceptable and agreeable with him to continue with examination and discussions with his sister and brother present.     DISCHARGE DIAGNOSES / PLAN:      S/P shock from hypovolemia and hypotension, now HTN  resolved with IVF, on Po Norvasc since 12/20/17.     Abnormal CT scan on admission with possible acute ischemic bowel (on ct scan on admission)  S/p ex lap on 12/18/17 - unremarkable with normal bowel - possible recovery of ischemic bowel   Now has resolution of bowel function to normal  Surgery following, needs dc post-op instructions from surgical team.  GI Diet being tolerated  C diff negative     Diarrhea on admission  cultures - so far negative  - Add po floraq & po Questran qday prn diarrheal stool.  - Complete po Flagyl x 10 days course  Lactic acidosis  From shock- reolved     ARCADIO on admission with metabolic acidosis, resolved  Possible prerenal in nature versus ATN from Hypotension   Off bicarb as acidosis resolved  Improved with hydration  Renal dose medications  Hold ACE/HCTZ  Renal US negative  Nephrology has signed off. Hx of Gout - on po allopurinol resume at dc.     Mild Hypokalemia  Replete & recheck. BMP/Mg check due on 12/27/17.     Bradycardia / HTN  Resolved - tele showing nsr  Followup Echo - Normal EF  - On Norvasc & po metoprolol      Etoh Abuse  On iv banana bag, will change to po Thiamine + folate + MVI  On CIWA protocol. So far patient cally w/o sign of withdrawal     Fever, CXR 12/21/ shows New patchy right perihilar airspace disease.  - on iv zosyn from 12/18/17 - 12/20, changed to iv levaquin 12/21/17  - encourage IS.  - Add bronchodilators  - Antitussives. - Bl cx from 12.18/17 & 12/20/17 remains ngt.     Hyperglycemia  A1c =5.8  Check Finsgersticks     Cachexia -- underweight on admission  Acute Hepatitis panel neg     Pt with childhood developmental delay PTA.     12/18 - had ex lap for diagnosis of ischemic bowel   Code status: Full  DVT prophylaxis: SCD     Care Plan discussed with: Patient/Family, Nurse and . Brother Ben Cam is [de-identified], tel no [phone] updated on 12/23/17. Disposition: PT eval noted, no needs identified, will dc to home on 12/24/17 with po ABX & if cleared by GS team.    Jasmina Vance specific discharge instructions:   No unfinished business,   no new oxygen needs,   no new anti-coagulation needs. New Rx changes    Rx thaimine, MVI, folate, Tx Amlodipine & rx metoprolol, Rx Tramadol prn pain, Rx Flagyl 500mg po tid, Rx Levaquin 750mg po qday, rx Questran po qday prn diarrheal stool. Rx Guafenesin po bid, rx Kcl 40meq po qday x 5 days, Rx floraq, Rx floraq po qday.        PENDING TEST RESULTS:   At the time of discharge the following test results are still pending: None   No    FOLLOW UP APPOINTMENTS:    Follow-up Information     Follow up With Details Comments [address], DO In 2 days hospital ff-up on 12/26/17, BMP / Mg recheck due on 12/27/17 [address]  [phone]      Ayo Dorantes MD In 10 days Post-op surgical wound management [address] [facility]  [address]  [phone]           ADDITIONAL CARE RECOMMENDATIONS: Ambulate, avoid alcohol    DIET: GI Lite diet, Ensure 1 can tid    ACTIVITY: Activity as tolerated    WOUND CARE: Keep surgical scar site dry    EQUIPMENT needed: None    DISCHARGE MEDICATIONS:  Current Discharge Medication List      START taking these medications    Details   amLODIPine (NORVASC) 5 mg tablet Take 1 Tab by mouth daily. Qty: 30 Tab, Refills: 0      folic acid (FOLVITE) 1 mg tablet Take 1 Tab by mouth daily. Qty: 30 Tab, Refills: 0      guaiFENesin ER (MUCINEX) 600 mg ER tablet Take 1 Tab by mouth every twelve (12) hours. Qty: 20 Tab, Refills: 0      metoprolol tartrate (LOPRESSOR) 25 mg tablet Take 1 Tab by mouth every twelve (12) hours. Qty: 60 Tab, Refills: 0      potassium chloride SR (K-TAB) 20 mEq tablet Take 2 Tabs by mouth daily. Qty: 10 Tab, Refills: 0      therapeutic multivitamin (THERAGRAN) tablet Take 1 Tab by mouth daily. Qty: 30 Tab, Refills: 0      thiamine (B-1) 100 mg tablet Take 1 Tab by mouth daily. Qty: 30 Tab, Refills: 0      cholestyramine-sucrose (QUESTRAN) 4 gram powder Take 4 g by mouth daily as needed. For diarrheal stool  Qty: 1 Can, Refills: 0      metroNIDAZOLE (FLAGYL) 500 mg tablet Take 1 Tab by mouth three (3) times daily. Qty: 30 Tab, Refills: 0      levoFLOXacin (LEVAQUIN) 750 mg tablet Take 1 Tab by mouth daily. Qty: 12 Tab, Refills: 0      lactobacillus acidoph-pectin (ACIDOPHILUS-PECTIN) 75 million cell -100 mg cap capsule Take 1 Cap by mouth daily.   Qty: 12 Cap, Refills: 0      traMADol (ULTRAM) 50 mg tablet Take 1 Tab by mouth every eight (8) hours as needed for Pain (Mod pain). Max Daily Amount: 150 mg.  Qty: 30 Tab, Refills: 0    Associated Diagnoses: Ischemic bowel disease ([address]      food supplemt, lactose-reduced (ENSURE ENLIVE) 0.08 gram-1.5 kcal/mL liqd Take 1 Can by mouth two (2) times a day. Qty: 30 Can, Refills: 0         CONTINUE these medications which have NOT CHANGED    Details   allopurinol (ZYLOPRIM) 100 mg tablet Take 100 mg by mouth daily. acetaminophen (TYLENOL) 500 mg tablet Take 500 mg by mouth every six (6) hours as needed for Pain. STOP taking these medications       lisinopril-hydroCHLOROthiazide (PRINZIDE, ZESTORETIC) 20-12.5 mg per tablet Comments:   Reason for Stopping:                 NOTIFY YOUR PHYSICIAN FOR ANY OF THE FOLLOWING:   Fever over 101 degrees for 24 hours. Chest pain, shortness of breath, fever, chills, nausea, vomiting, diarrhea, change in mentation, falling, weakness, bleeding. Severe pain or pain not relieved by medications. Or, any other signs or symptoms that you may have questions about. DISPOSITION:  x  Home With: family   OT  PT  HH  RN       Long term SNF/Inpatient Rehab    Independent/assisted living    Hospice    Other:       PATIENT CONDITION AT DISCHARGE:     Functional status    Poor     Deconditioned    x Independent      Cognition     Lucid     Forgetful     Dementia      Catheters/lines (plus indication)    [place]     PICC     PEG    x None      Code status   x  Full code     DNR      PHYSICAL EXAMINATION AT DISCHARGE:  Last 24hrs VS reviewed since prior progress note.  Most recent are:       Visit Vitals    /75 (BP 1 Location: Right arm, BP Patient Position: At rest)    Pulse 72    Temp 98.3 °F (36.8 °C)    Resp 18    Ht 5' 2\" (1.575 m)    Wt 44.6 kg (98 lb 5.2 oz)    SpO2 97%    BMI 17.98 kg/m2      Patient Vitals for the past 24 hrs:    Temp Pulse Resp BP SpO2   12/24/17 0924 - - - - 97 %   12/24/17 0746 98.3 °F (36.8 °C) 72 18 107/75 98 %   12/24/17 0004 99.1 °F (37.3 °C) 78 18 122/77 95 %   12/23/17 2031 - (!) 101 - 124/81 -   12/23/17 1925 - - - - 96 %   12/23/17 1608 99.5 °F (37.5 °C) 96 18 (!) 135/94 95 %   12/23/17 1529 - - - - 96 %      No intake or output data in the 24 hours ending 12/24/17 1242      Physical Examination:             Constitutional:  No acute distress, cooperative, pleasant    ENT:  Oral mucous moist, oropharynx benign. Neck supple,    Resp:  CTA bilaterally. No wheezing/rhonchi/rales. No accessory muscle use   CV:  Regular rhythm, normal rate, no murmurs, gallops, rubs    GI:  Soft, non distended, Midline Abd incision in place with staples, no dehiscence. non tender. normoactive bowel sounds, no hepatosplenomegaly     Musculoskeletal:  No edema, warm, 2+ pulses throughout    Neurologic:  Moves all extremities. AAOx3, CN II-XII reviewed                                             Psych:  Good insight, Not anxious nor agitated.         Data Review:    Review and/or order of clinical lab test  Review and/or order of tests in the radiology section of CPT  Decision to obtain old records and/or obtain history from someone other than the patient        Labs:          Recent Labs       12/23/17   0311   WBC  4.6   HGB  9.8*   HCT  28.2*   PLT  116*          Recent Labs       12/23/17   0311   NA  137   K  3.4*   CL  102   CO2  25   BUN  10   CREA  0.90   GLU  89   CA  7.7*      No results for input(s): SGOT, GPT, ALT, AP, TBIL, TBILI, TP, ALB, GLOB, GGT, AML, LPSE in the last 72 hours.     No lab exists for component: AMYP, HLPSE  No results for input(s): INR, PTP, APTT in the last 72 hours.     No lab exists for component: INREXT, INREXT   No results for input(s): FE, TIBC, PSAT, FERR in the last 72 hours. No results found for: FOL, RBCF   No results for input(s): PH, PCO2, PO2 in the last 72 hours.        Recent Labs       12/22/17   0401  12/21/17   1731   CPK   --   1378*   CKNDX   --   0.1   TROIQ  0.31*  0.65*      No results found for: CHOL, CHOLX, CHLST, CHOLV, HDL, LDL, LDLC, DLDLP, TGLX, TRIGL, TRIGP, CHHD, CHHDX  No results found for: [facility]  Lab Results   Component Value Date/Time     Color YELLOW/STRAW 12/20/2017 11:31 PM     Appearance CLEAR 12/20/2017 11:31 PM     Specific gravity 1.015 12/20/2017 11:31 PM     Specific gravity 1.012 12/18/2017 08:31 PM     pH (UA) 6.0 12/20/2017 11:31 PM     Protein 30 12/20/2017 11:31 PM     Glucose 100 12/20/2017 11:31 PM     Ketone NEGATIVE  12/20/2017 11:31 PM     Bilirubin NEGATIVE  12/20/2017 11:31 PM     Urobilinogen 0.2 12/20/2017 11:31 PM     Nitrites NEGATIVE  12/20/2017 11:31 PM     Leukocyte Esterase NEGATIVE  12/20/2017 11:31 PM     Epithelial cells FEW 12/20/2017 11:31 PM     Bacteria NEGATIVE  12/20/2017 11:31 PM     WBC 0-4 12/20/2017 11:31 PM     RBC 5-10 12/20/2017 11:31 PM      ECHO  Left ventricle: Systolic function was normal. Ejection fraction was  estimated in the range of 55 % to 60 %. There were no regional wall motion  abnormalities.     RADIOLOGY:  * INDICATION:    fevers,      EXAMINATION:  AP CHEST, PORTABLE      COMPARISON: December 19, 2017      FINDINGS: Single AP portable view of the chest at 1110 hours demonstrates  unchanged right IJ line. The cardiomediastinal silhouette is stable. Development  of patchy right perihilar airspace disease. No pneumothorax.  Linear atelectasis  medial left lower lobe.      IMPRESSION  IMPRESSION:   New patchy right perihilar airspace disease. No pneumothorax. Signed Date/Time    Phone Pager   Pan Funez 12/21/2017 11:36 [phone]      * EXAM: CT ABDOMEN AND PELVIS WITHOUT CONTRAST  INDICATION: Earna Lazar with elevated lactate in acute renal failure. COMPARISON: None. CONTRAST: None.      TECHNIQUE:   Unenhanced multislice helical CT was performed from the diaphragm to the  symphysis pubis without intravenous contrast administration. Oral contrast was  not administered.  Contiguous 5 mm axial images were reconstructed and lung and  soft tissue windows were generated. Coronal and sagittal reformations were  generated. CT dose reduction was achieved through use of a standardized protocol  tailored for this examination and automatic exposure control for dose  modulation. Adaptive statistical iterative reconstruction (ASIR) was utilized  for this examination.      FINDINGS:  LOWER CHEST: The visualized portions of the lung bases are clear. The absence of intravenous contrast material reduces the sensitivity for  evaluation of the solid parenchymal organs of the abdomen. ABDOMEN:  Liver: The liver is normal in size and contour with no focal abnormality. Gallbladder and bile ducts: There are no calcified stones and there is no  biliary duct dilatation. Spleen: No abnormality. Pancreas: No abnormality. Adrenal glands: No abnormality. Kidneys: No focal parenchymal abnormality. There is no renal or ureteral  calculus or obstruction. PELVIS:  Reproductive organs: The prostate gland and seminal vesicles are symmetrical.    Bladder: There is a Ellington catheter in the urinary bladder. The bladder wall is  mildly thickened and there is some air in the bladder. RETROPERITONEUM: The aorta tapers without aneurysm. There is no retroperitoneal  adenopathy or mass. There is no pelvic mass or adenopathy. BOWEL AND MESENTERY: There are multiple thickened small bowel loops with some  pneumatosis intestinalis. The small bowel is not obstructed. The appendix is not  identified. PERITONEUM: There is no ascites or free intraperitoneal air. BONES AND SOFT TISSUES: The bones and soft tissues of the abdominal wall are  within normal limits.      IMPRESSION  IMPRESSION: Thickened small bowel loops with pneumatosis intestinalis. The  appearance suggests possible ischemic bowel.   Signed Date/Time    Phone Pager   Shubham WHYTE 12/18/2017 21:18 [phone]      Medications Reviewed:             Current Facility-Administered Medications   Medication Dose Route Frequency    levoFLOXacin (LEVAQUIN) 750 mg in D5W IVPB  750 mg IntraVENous Q24H    albuterol-ipratropium (DUO-NEB) 2.5 MG-0.5 MG/3 ML  3 mL Nebulization Q6H RT    guaiFENesin ER (MUCINEX) tablet 600 mg  600 mg Oral Q12H    potassium chloride SR (KLOR-CON 10) tablet 40 mEq  40 mEq Oral DAILY    thiamine (B-1) tablet 100 mg  100 mg Oral DAILY    therapeutic multivitamin (THERAGRAN) tablet 1 Tab  1 Tab Oral DAILY    folic acid (FOLVITE) tablet 1 mg  1 mg Oral DAILY    metoprolol tartrate (LOPRESSOR) tablet 25 mg  25 mg Oral Q12H    metroNIDAZOLE (FLAGYL) IVPB premix 500 mg  500 mg IntraVENous Q8H    lactated Ringers infusion  25 mL/hr IntraVENous CONTINUOUS    amLODIPine (NORVASC) tablet 5 mg  5 mg Oral DAILY    acetaminophen (TYLENOL) tablet 650 mg  650 mg Oral Q6H PRN    sodium chloride (NS) flush 5-10 mL  5-10 mL IntraVENous Q8H    sodium chloride (NS) flush 5-10 mL  5-10 mL IntraVENous PRN    morphine injection 2 mg  2 mg IntraVENous Q2H PRN    LORazepam (ATIVAN) injection 1 mg  1 mg IntraVENous Q4H PRN    LORazepam (ATIVAN) injection 2 mg  2 mg IntraVENous Q6H PRN           CHRONIC MEDICAL DIAGNOSES:  Problem List as of 12/24/2017  Date Reviewed: 12/19/2017          Codes Class Noted - Resolved    Lactic acidosis ICD-10-CM: E87.2  ICD-9-CM: 276.2  12/18/2017 - Present        * (Principal)Shock ([facility] [address] ICD-10-CM: R57.9  ICD-9-CM: 785.50  12/18/2017 - Present        Syncope ICD-10-CM: R55  ICD-9-CM: 780.2  12/18/2017 - Present        ARF (acute renal failure) (HCC) ICD-10-CM: N17.9  ICD-9-CM: 584.9  12/18/2017 - Present        Symptomatic bradycardia ICD-10-CM: R00.1  ICD-9-CM: 427.89  12/18/2017 - Present        Pneumatosis intestinalis ICD-10-CM: K63.89  ICD-9-CM: 569.89  12/18/2017 - Present        RESOLVED: Ischemic bowel disease ([facility] [address] ICD-10-CM: K55.9  ICD-9-CM: 557.9  12/18/2017 - 12/19/2017              Greater than 35 minutes were spent with the patient on counseling and coordination of care    Signed:   Gabi Lane MD  12/24/2017  1:01 PM

## 2017-12-26 LAB
BACTERIA SPEC CULT: NORMAL
CRYOGLOB SER QL 1D COLD INC: NORMAL
SERVICE CMNT-IMP: NORMAL

## 2018-01-18 ENCOUNTER — OFFICE VISIT (OUTPATIENT)
Dept: SURGERY | Age: 63
End: 2018-01-18

## 2018-01-18 VITALS
HEIGHT: 62 IN | HEART RATE: 53 BPM | TEMPERATURE: 97.6 F | OXYGEN SATURATION: 99 % | SYSTOLIC BLOOD PRESSURE: 90 MMHG | BODY MASS INDEX: 18.07 KG/M2 | DIASTOLIC BLOOD PRESSURE: 60 MMHG | RESPIRATION RATE: 18 BRPM | WEIGHT: 98.2 LBS

## 2018-01-18 DIAGNOSIS — Z09 POSTOPERATIVE EXAMINATION: Primary | ICD-10-CM

## 2018-01-18 NOTE — PROGRESS NOTES
Post op for exploratory lap on 12/18/17. 1. Have you been to the ER, urgent care clinic since your last visit? Hospitalized since your last visit? No    2. Have you seen or consulted any other health care providers outside of the [address] [place] [address] since your last visit? Include any pap smears or colon screening.  No

## 2018-01-20 NOTE — PROGRESS NOTES
Subjective: Andre Carrillo is a 65 y.o. male presents for postop care 3 weeks following laparotomy (negative-no ischemic bowel). Appetite is good. Eating a regular diet without difficulty. Bowel movements are regular. The patient is voiding without difficulty. The patient is not having any pain. .    Objective:     Visit Vitals    BP 90/60 (BP 1 Location: Right arm, BP Patient Position: Sitting)    Pulse (!) 53    Temp 97.6 °F (36.4 °C) (Oral)    Resp 18    Ht 5' 2\" (1.575 m)    Wt 98 lb 3.2 oz (44.5 kg)    SpO2 99%    BMI 17.96 kg/m2       General:  alert, cooperative, no distress, appears older than stated age, cachectic   Abdomen: soft, non-tender, no hernias   Incision:   healing well, no drainage, no erythema, no hernia, no seroma, no swelling, no dehiscence, incision well approximated     Assessment:     Doing well postoperatively. Plan:     1. Continue current medications. 2. Staples removed. 3. Pt is to increase activities as tolerated. Alicia Aguilar

## 2018-01-20 NOTE — PATIENT INSTRUCTIONS
Acute Kidney Injury: Care Instructions  Your Care Instructions    Acute kidney injury (ARCADIO) is a sudden decrease in kidney function. This can happen over a period of hours, days or, in some cases, weeks. ARCADIO used to be called acute renal failure, but kidney failure doesn't always happen with ARCADIO. Common causes of ARCADIO are dehydration, blood loss, and medicines. When ARCADIO happens, the kidneys have trouble removing waste and excess fluids from the body. The waste and fluids build up and become harmful. Kidney function may return to normal if the cause of ARCADIO is treated quickly. Your chance of a full recovery depends on what caused the problem, how quickly the cause was treated, and what other medical problems you have. A machine may be used to help your kidneys remove waste and fluids for a short period of time. This is called dialysis. Follow-up care is a key part of your treatment and safety. Be sure to make and go to all appointments, and call your doctor if you are having problems. It's also a good idea to know your test results and keep a list of the medicines you take. How can you care for yourself at home? · Talk to your doctor about how much fluid you should drink. · Eat a balanced diet. Talk to your doctor or a dietitian about what type of diet may be best for you. You may need to limit sodium, potassium, and phosphorus. · If you need dialysis, follow the instructions and schedule for dialysis that your doctor gives you. · Do not smoke. Smoking can make your condition worse. If you need help quitting, talk to your doctor about stop-smoking programs and medicines. These can increase your chances of quitting for good. · Do not drink alcohol. · Review all of your medicines with your doctor. Do not take any medicines, including nonsteroidal anti-inflammatory drugs (NSAIDs) such as ibuprofen (Advil, Motrin) or naproxen (Aleve), unless your doctor says it is safe for you to do so.   · Make sure that anyone treating you for any health problem knows that you have had ARCADIO. When should you call for help? Call 911 anytime you think you may need emergency care. For example, call if:  ? · You passed out (lost consciousness). ?Call your doctor now or seek immediate medical care if:  ? · You have new or worse nausea and vomiting. ? · You have much less urine than normal, or you have no urine. ? · You are feeling confused or cannot think clearly. ? · You have new or more blood in your urine. ? · You have new swelling. ? · You are dizzy or lightheaded, or feel like you may faint. ? Watch closely for changes in your health, and be sure to contact your doctor if:  ? · You do not get better as expected. Where can you learn more? Go to http://rubin-nicole.info/. Enter M022 in the search box to learn more about \"Acute Kidney Injury: Care Instructions. \"  Current as of: May 12, 2017  Content Version: 11.4  © 9128-0517 [employer]. Care instructions adapted under license by [employer] (which disclaims liability or warranty for this information). If you have questions about a medical condition or this instruction, always ask your healthcare professional. [address] any warranty or liability for your use of this information.

## 2018-02-21 NOTE — ED NOTES
Pt placed in bed in position of comfort with side rails up x 2, call bell within reach. Pt instructed to call for nursing assistance with any ambulation or other needs. Home

## 2021-09-21 ENCOUNTER — TRANSCRIBE ORDER (OUTPATIENT)
Dept: SCHEDULING | Age: 66
End: 2021-09-21

## 2021-09-21 DIAGNOSIS — E46 UNSPECIFIED PROTEIN-CALORIE MALNUTRITION (HCC): Primary | ICD-10-CM

## 2022-03-18 PROBLEM — R57.9 SHOCK (HCC): Status: ACTIVE | Noted: 2017-12-18

## 2022-03-19 PROBLEM — E87.20 LACTIC ACIDOSIS: Status: ACTIVE | Noted: 2017-12-18

## 2022-03-19 PROBLEM — R55 SYNCOPE: Status: ACTIVE | Noted: 2017-12-18

## 2022-03-19 PROBLEM — K63.89 PNEUMATOSIS INTESTINALIS: Status: ACTIVE | Noted: 2017-12-18

## 2022-03-19 PROBLEM — N17.9 ARF (ACUTE RENAL FAILURE) (HCC): Status: ACTIVE | Noted: 2017-12-18

## 2022-03-19 PROBLEM — R00.1 SYMPTOMATIC BRADYCARDIA: Status: ACTIVE | Noted: 2017-12-18

## 2024-09-06 ENCOUNTER — TRANSCRIBE ORDERS (OUTPATIENT)
Facility: HOSPITAL | Age: 69
End: 2024-09-06

## 2024-09-06 DIAGNOSIS — S09.90XA TRAUMATIC INJURY OF HEAD, INITIAL ENCOUNTER: Primary | ICD-10-CM

## 2024-09-12 ENCOUNTER — HOSPITAL ENCOUNTER (OUTPATIENT)
Facility: HOSPITAL | Age: 69
Discharge: HOME | End: 2024-09-15
Payer: MEDICARE

## 2024-09-12 DIAGNOSIS — S09.90XA TRAUMATIC INJURY OF HEAD, INITIAL ENCOUNTER: ICD-10-CM

## 2024-09-12 PROCEDURE — 70450 CT HEAD/BRAIN W/O DYE: CPT

## (undated) DEVICE — SUTURE PERMAHAND SZ 2-0 L30IN NONABSORBABLE BLK SILK W/O A305H

## (undated) DEVICE — STERILE POLYISOPRENE POWDER-FREE SURGICAL GLOVES WITH EMOLLIENT COATING: Brand: PROTEXIS

## (undated) DEVICE — (D)PREP SKN CHLRAPRP APPL 26ML -- CONVERT TO ITEM 371833

## (undated) DEVICE — ROCKER SWITCH PENCIL BLADE ELECTRODE, HOLSTER: Brand: EDGE

## (undated) DEVICE — SLIM BODY SKIN STAPLER: Brand: APPOSE ULC

## (undated) DEVICE — SOLUTION IV 1000ML 0.9% SOD CHL

## (undated) DEVICE — KENDALL SCD EXPRESS SLEEVES, KNEE LENGTH, MEDIUM: Brand: KENDALL SCD

## (undated) DEVICE — DERMABOND SKIN ADH 0.7ML -- DERMABOND ADVANCED 12/BX

## (undated) DEVICE — TOWEL SURG W17XL27IN STD BLU COT NONFENESTRATED PREWASHED

## (undated) DEVICE — SKIN TEMPERATURE SENSOR: Brand: DEROYAL

## (undated) DEVICE — REM POLYHESIVE ADULT PATIENT RETURN ELECTRODE: Brand: VALLEYLAB

## (undated) DEVICE — SUTURE PDS II SZ 1 L96IN ABSRB VLT TP-1 L65MM 1/2 CIR Z880G

## (undated) DEVICE — DBD-PACK,LAPAROTOMY,2 REINFORCED GOWNS: Brand: MEDLINE

## (undated) DEVICE — SURGICAL PROCEDURE PACK BASIN MAJ SET CUST NO CAUT

## (undated) DEVICE — DEVON™ KNEE AND BODY STRAP 60" X 3" (1.5 M X 7.6 CM): Brand: DEVON

## (undated) DEVICE — DRAPE FLD WRM W44XL66IN C6L FOR INTRATEMP SYS THERMABASIN

## (undated) DEVICE — SUTURE PERMAHAND SZ 3-0 L18IN NONABSORBABLE BLK L26MM SH C013D

## (undated) DEVICE — SOLUTION IRRIG 1000ML H2O STRL BLT

## (undated) DEVICE — INFECTION CONTROL KIT SYS

## (undated) DEVICE — HANDPIECE LAP L23MM DIA5MM VES SEAL CUT CRV JAW PSTL GRP

## (undated) DEVICE — WRAP SURG W1.31XL1.34M CARD FOR PT 165-172CM THERMOWRP

## (undated) DEVICE — HANDLE LT SNAP ON ULT DURABLE LENS FOR TRUMPF ALC DISPOSABLE